# Patient Record
Sex: FEMALE | Race: WHITE | NOT HISPANIC OR LATINO | Employment: FULL TIME | URBAN - METROPOLITAN AREA
[De-identification: names, ages, dates, MRNs, and addresses within clinical notes are randomized per-mention and may not be internally consistent; named-entity substitution may affect disease eponyms.]

---

## 2017-01-31 NOTE — PROGRESS NOTES
"Rheumatology Visit     Veronica Sen MRN# [id number]   YOB: 1965 Age: 51 year old     Date of Visit: February 1, 2017  Primary care provider: Aide Mccord        Assessment and Plan:   1. Psoriatic arthritis/spondyloarthropathy: (oligoarthritis; strong family history of psoriasis; 9-15 hand xray erosion): Joint pain is less in the past month. Exam shows reduced tenderness at R 1rst CMC and anatomic snuffbox; unchanged flexion contracture R 5th DIP. In lab, 11-16 CBC, LFT are WNL.    I think that joint disease is improved. Whether or not improvement bears a causal relationship with starting apremilast cannot be determined for another 4-5 weeks. Arthropathy has featured steroid sensitivity, but she had no response to mtx or humira, and SSZ was not tolerated. Although she has seronegative SpA with minimal cutaneous burden, I believe that immunomodulatory Rx for a working dx of PsA could benefit her. We agreed upon the following plan:    a) Continue trial of Otezla 30 mg po bid. I think that maximum benefit should be noticeable within 60 days.  --CBC, Cr, CRP in 4-5 weeks.    2. R thumb pain, with De Quervain's tenosynovitis: she is more comfortable with exercises and splinting. I recommend naproxen 500 mg bid prn recurrent pain.    RTC 6 mos.    Joe Rodriguez MD  Staff Rheumatologist,  Health  Pager [phone]            Active Problem List:     Patient Active Problem List    Diagnosis Date Noted     Psoriatic arthritis (H) 04/11/2016     Priority: Medium            History of Present Illness:   Veronica Sen presents for follow-up psoriatic arthritis vs erosive OA. Last seen 7-16, when SSZ was discontinued, and trial of otezla was begun.    She has been seen in 2013 by Dr. Madrigal, Rheum, who diagnosed \"erosive osteoarthritis\". TSH, ESR, KRYSTA, Lyme were all negative in 2014. Started Mtx 15 mg weekly in 9-15. Discontinued mtx in 2-16 d/t lack of efficacy. SSZ d/c'd in 3-16 d/t GI " "intolerance. Humira trial 2016: no efficacy. Otezla 1-17 start.    Interval history:    She started otezla 1-17. Since then she notes reduction in joint symptoms, including less \"pressure\" sensation over both hands/fingers and feet, and less night pain. Ankles are less sore. Mornings are ok without stiffness. No low back pain. Tolerating daily walks.    The base of the R thumb is improved; she associates improvement in writing, sewing, handwork of all kinds, with OTx-prescribed exercises and occasional use of splint. She still notes that  opening jars is still adversely affected by the R thumb pain.    She has d/c'd regular use of OTC naproxen 440 bid due to reduced need for pain medication.  She started lipitor 12-16 for increased cholesterol.  No recurrence of hairline scalp psoriasis spot.       Review of Systems:   Review Of Systems  Constitutional: negative  Skin: rash on scalp improved  Eyes: continued dry eyes, managed with systane  Ears/Nose/Throat: HA :\"cluster\" around time of starting otezla  Respiratory: No shortness of breath, dyspnea on exertion, cough, or hemoptysis  Cardiovascular: negative  Gastrointestinal: adhesion related abd pain-tenderness.  Genitourinary: negative  Musculoskeletal: HPI  Neurologic: negative  Psychiatric: negative  Hematologic/Lymphatic/Immunologic: no LAP recently.  Endocrine: negative          Past Medical History:   No past medical history on file.  No past surgical history on file.  S/p bowel resection in 1-15 after ruptured diverticulum and abscess. She had colostomy, s/p takedown in 4-15. No evidence of colitis on Path. No colitis on colonoscopy    S/p childhood tonsilitis  S/p hysterectomy 2011  S/p C section X 3  Migraine HA since age 14  GERD, controlled with omeprazole  Has laryngospasm    Spondyloarthropathy: She has been seen in 2013 by Dr. Madrigal, Rheum, who diagnosed \"erosive osteoarthritis\". TSH, ESR, KRYSTA, Lyme were all negative in 2014. Started Mtx 15 mg weekly " in 9-15. Discontinued mtx in 2-16 d/t lack of efficacy. SSZ d/c'd in 3-16 d/t GI intolerance. Humira trial 2016: no efficacy. Otezla 7-16 start.       Social History:     Social History     Occupational History     Not on file.     Social History Main Topics     Smoking status: Never Smoker      Smokeless tobacco: Not on file     Alcohol Use: Not on file     Drug Use: Not on file     Sexual Activity: Not on file     3 children  Never smoker  Drinks beer 3 x per week  Enjoys walking 2.5 miles 3X per week--formerly ran [employer]  Works as a nurse part-time  ; spouse is a family physician       Family History:   No family history on file.  Mother and brother have psoriasis  Mother and father have arthritis in hands/ankles--onset in '70s.  2 children have central hypothyroidism  Nephew had growth hormone defcy  Youngest son has sz disorder.  Middle daughter has torn hip capsule.       Allergies:     Allergies   Allergen Reactions     Hydrocodone Nausea and Vomiting and Itching     Morphine Nausea and Vomiting and Itching   Sulfasalazine: bloating         Medications:     Current Outpatient Prescriptions   Medication Sig Dispense Refill     adalimumab (HUMIRA) 40 MG/0.8ML prefilled syringe kit Inject subcutaneously once every 2 weeks. 1 kit 5     apremilast (OTEZLA) 30 MG tablet Take 1 tablet (30 mg) by mouth 2 times daily. Hold for signs of infection, and then seek medical attention. 60 tablet 5     Apremilast (OTEZLA) 10 & 20 & 30 MG TBPK Take by mouth, according to the instructions on the packet.Hold for signs of infection,any GI upset, weight loss or depression concerns, and then seek medical attention. 1 each 0     OMEPRAZOLE PO Take 20 mg by mouth every morning       VITAMIN D, CHOLECALCIFEROL, PO Take 15,000 Units by mouth once a week       Multiple Vitamins-Minerals (MULTIVITAMIN PO) Take 1 tablet by mouth daily       SUMAtriptan Succinate (IMITREX PO) Take 50 mg by mouth as needed for migraine        "Cetirizine HCl (ZYRTEC ALLERGY PO) Take 10 mg by mouth as needed     Vit D 2000 meq daily  Uses imitrex 3X/month         Physical Exam:   Blood pressure 126/84, pulse 75, height 1.575 m (5' 2\"), weight 80.74 kg (178 lb), SpO2 99 %.  Wt Readings from Last 4 Encounters:   02/01/17 80.74 kg (178 lb)   07/13/16 77.747 kg (171 lb 6.4 oz)   04/06/16 77.792 kg (171 lb 8 oz)   12/02/15 78.472 kg (173 lb)     Constitutional: mild obesity; appearing younger than stated age; cooperative  Eyes: nl EOM, PERRLA, vision, conjunctiva, sclera  ENT: nl external ears, nose, hearing, lips, teeth, gums, throat  No mucous membrane lesions, normal saliva pool  Neck: no mass or thyroid enlargement  Resp: lungs clear to auscultation, nl to palpation  CV: RRR, no murmurs, rubs or gallops, no edema  GI: no ABD mass or tenderness, no HSM  : not tested  Lymph: no cervical, supraclavicular, inguinal or epitrochlear nodes  MS: All TMJ, neck, shoulder, elbow, wrist, MCP/PIP/DIP, spine, hip, knee, ankle, and foot MTP/IP joints were examined. No active synovitis or deformity. Full ROM.  Normal  strength. + flexion contracture, R5th DIP; subtle [place]-neck changes in multiple digits both hands; mildy tender at R 1rst CMC with circumduction.  Skin: Resolved: 0.5 cm patch of erythema and scale inside L auricle  Neuro: nl cranial nerves, strength, sensation, DTRs.   Psych: nl judgement, orientation, memory, affect.         Data:   Care everywhere results reviewed    Recent Labs   Lab Test  11/30/16   1335  07/13/16   1209  12/02/15   1149   WBC  5.5  5.8  8.2   HGB  14.0  13.6  13.3   HCT  40.5  40.4  39.7   MCV  92.7  93  94   PLT  258  211  283   AST  24  18  28   ALT  42  29  52*       Reviewed Rheumatology lab flowsheet  "

## 2017-02-01 ENCOUNTER — OFFICE VISIT (OUTPATIENT)
Dept: RHEUMATOLOGY | Facility: CLINIC | Age: 52
End: 2017-02-01
Attending: INTERNAL MEDICINE
Payer: COMMERCIAL

## 2017-02-01 VITALS
SYSTOLIC BLOOD PRESSURE: 126 MMHG | HEART RATE: 75 BPM | WEIGHT: 178 LBS | DIASTOLIC BLOOD PRESSURE: 84 MMHG | HEIGHT: 62 IN | BODY MASS INDEX: 32.76 KG/M2 | OXYGEN SATURATION: 99 %

## 2017-02-01 DIAGNOSIS — L40.50 PSORIATIC ARTHRITIS (H): Primary | ICD-10-CM

## 2017-02-01 PROCEDURE — 99212 OFFICE O/P EST SF 10 MIN: CPT | Mod: ZF

## 2017-02-01 ASSESSMENT — PAIN SCALES - GENERAL: PAINLEVEL: MILD PAIN (2)

## 2017-02-01 NOTE — NURSING NOTE
"Chief Complaint   Patient presents with     RECHECK     4 month follow up for Psoriatic arthritis (H)       Initial /84 mmHg  Pulse 75  Ht 1.575 m (5' 2\")  Wt 80.74 kg (178 lb)  BMI 32.55 kg/m2  SpO2 99% Estimated body mass index is 32.55 kg/(m^2) as calculated from the following:    Height as of this encounter: 1.575 m (5' 2\").    Weight as of this encounter: 80.74 kg (178 lb).  BP completed using cuff size: armin Mullins CMA    "

## 2017-02-01 NOTE — Clinical Note
"2/1/2017      RE: Veronica Sen  [address] [place] [address]       Rheumatology Visit     Veronica Sen MRN# [id number]   YOB: 1965 Age: 51 year old     Date of Visit: February 1, 2017  Primary care provider: Aide Mccord        Assessment and Plan:   1. Psoriatic arthritis/spondyloarthropathy: (oligoarthritis; strong family history of psoriasis; 9-15 hand xray erosion): Joint pain is less in the past month. Exam shows reduced tenderness at R 1rst CMC and anatomic snuffbox; unchanged flexion contracture R 5th DIP. In lab, 11-16 CBC, LFT are WNL.    I think that joint disease is improved. Whether or not improvement bears a causal relationship with starting apremilast cannot be determined for another 4-5 weeks. Arthropathy has featured steroid sensitivity, but she had no response to mtx or humira, and SSZ was not tolerated. Although she has seronegative SpA with minimal cutaneous burden, I believe that immunomodulatory Rx for a working dx of PsA could benefit her. We agreed upon the following plan:    a) Continue trial of Otezla 30 mg po bid. I think that maximum benefit should be noticeable within 60 days.  --CBC, Cr, CRP in 4-5 weeks.    2. R thumb pain, with De Quervain's tenosynovitis: she is more comfortable with exercises and splinting. I recommend naproxen 500 mg bid prn recurrent pain.    RTC 6 mos.    oJe Rodriguez MD  Staff Rheumatologist,  Health  Pager [phone]            Active Problem List:     Patient Active Problem List    Diagnosis Date Noted     Psoriatic arthritis (H) 04/11/2016     Priority: Medium            History of Present Illness:   Veronica Sen presents for follow-up psoriatic arthritis vs erosive OA. Last seen 7-16, when SSZ was discontinued, and trial of otezla was begun.    She has been seen in 2013 by Dr. Madrigal, Rheum, who diagnosed \"erosive osteoarthritis\". TSH, ESR, KRYSTA, Lyme were all negative in 2014. Started Mtx 15 mg weekly in 9-15. " "Discontinued mtx in 2-16 d/t lack of efficacy. SSZ d/c'd in 3-16 d/t GI intolerance. Humira trial 2016: no efficacy. Otezla 1-17 start.    Interval history:    She started otezla 1-17. Since then she notes reduction in joint symptoms, including less \"pressure\" sensation over both hands/fingers and feet, and less night pain. Ankles are less sore. Mornings are ok without stiffness. No low back pain. Tolerating daily walks.    The base of the R thumb is improved; she associates improvement in writing, sewing, handwork of all kinds, with OTx-prescribed exercises and occasional use of splint. She still notes that  opening jars is still adversely affected by the R thumb pain.    She has d/c'd regular use of OTC naproxen 440 bid due to reduced need for pain medication.  She started lipitor 12-16 for increased cholesterol.  No recurrence of hairline scalp psoriasis spot.       Review of Systems:   Review Of Systems  Constitutional: negative  Skin: rash on scalp improved  Eyes: continued dry eyes, managed with systane  Ears/Nose/Throat: HA :\"cluster\" around time of starting otezla  Respiratory: No shortness of breath, dyspnea on exertion, cough, or hemoptysis  Cardiovascular: negative  Gastrointestinal: adhesion related abd pain-tenderness.  Genitourinary: negative  Musculoskeletal: HPI  Neurologic: negative  Psychiatric: negative  Hematologic/Lymphatic/Immunologic: no LAP recently.  Endocrine: negative          Past Medical History:   No past medical history on file.  No past surgical history on file.  S/p bowel resection in 1-15 after ruptured diverticulum and abscess. She had colostomy, s/p takedown in 4-15. No evidence of colitis on Path. No colitis on colonoscopy    S/p childhood tonsilitis  S/p hysterectomy 2011  S/p C section X 3  Migraine HA since age 14  GERD, controlled with omeprazole  Has laryngospasm    Spondyloarthropathy: She has been seen in 2013 by Dr. Madrigal, Rheum, who diagnosed \"erosive osteoarthritis\". " TSH, ESR, KRYSTA, Lyme were all negative in 2014. Started Mtx 15 mg weekly in 9-15. Discontinued mtx in 2-16 d/t lack of efficacy. SSZ d/c'd in 3-16 d/t GI intolerance. Humira trial 2016: no efficacy. Otezla 7-16 start.       Social History:     Social History     Occupational History     Not on file.     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  Mother and brother have psoriasis  Mother and father have arthritis in hands/ankles--onset in '70s.  2 children have central hypothyroidism  Nephew had growth hormone defcy  Youngest son has sz disorder.  Middle daughter has torn hip capsule.       Allergies:     Allergies   Allergen Reactions     Hydrocodone Nausea and Vomiting and Itching     Morphine Nausea and Vomiting and Itching   Sulfasalazine: bloating         Medications:     Current Outpatient Prescriptions   Medication Sig Dispense Refill     adalimumab (HUMIRA) 40 MG/0.8ML prefilled syringe kit Inject subcutaneously once every 2 weeks. 1 kit 5     apremilast (OTEZLA) 30 MG tablet Take 1 tablet (30 mg) by mouth 2 times daily. Hold for signs of infection, and then seek medical attention. 60 tablet 5     Apremilast (OTEZLA) 10 & 20 & 30 MG TBPK Take by mouth, according to the instructions on the packet.Hold for signs of infection,any GI upset, weight loss or depression concerns, and then seek medical attention. 1 each 0     OMEPRAZOLE PO Take 20 mg by mouth every morning       VITAMIN D, CHOLECALCIFEROL, PO Take 15,000 Units by mouth once a week       Multiple Vitamins-Minerals (MULTIVITAMIN PO) Take 1 tablet by mouth daily       SUMAtriptan  "Succinate (IMITREX PO) Take 50 mg by mouth as needed for migraine       Cetirizine HCl (ZYRTEC ALLERGY PO) Take 10 mg by mouth as needed     Vit D 2000 meq daily  Uses imitrex 3X/month         Physical Exam:   Blood pressure 126/84, pulse 75, height 1.575 m (5' 2\"), weight 80.74 kg (178 lb), SpO2 99 %.  Wt Readings from Last 4 Encounters:   02/01/17 80.74 kg (178 lb)   07/13/16 77.747 kg (171 lb 6.4 oz)   04/06/16 77.792 kg (171 lb 8 oz)   12/02/15 78.472 kg (173 lb)     Constitutional: mild obesity; appearing younger than stated age; cooperative  Eyes: nl EOM, PERRLA, vision, conjunctiva, sclera  ENT: nl external ears, nose, hearing, lips, teeth, gums, throat  No mucous membrane lesions, normal saliva pool  Neck: no mass or thyroid enlargement  Resp: lungs clear to auscultation, nl to palpation  CV: RRR, no murmurs, rubs or gallops, no edema  GI: no ABD mass or tenderness, no HSM  : not tested  Lymph: no cervical, supraclavicular, inguinal or epitrochlear nodes  MS: All TMJ, neck, shoulder, elbow, wrist, MCP/PIP/DIP, spine, hip, knee, ankle, and foot MTP/IP joints were examined. No active synovitis or deformity. Full ROM.  Normal  strength. + flexion contracture, R5th DIP; subtle [place]-neck changes in multiple digits both hands; mildy tender at R 1rst CMC with circumduction.  Skin: Resolved: 0.5 cm patch of erythema and scale inside L auricle  Neuro: nl cranial nerves, strength, sensation, DTRs.   Psych: nl judgement, orientation, memory, affect.         Data:   Care everywhere results reviewed    Recent Labs   Lab Test  11/30/16   1335  07/13/16   1209  12/02/15   1149   WBC  5.5  5.8  8.2   HGB  14.0  13.6  13.3   HCT  40.5  40.4  39.7   MCV  92.7  93  94   PLT  258  211  283   AST  24  18  28   ALT  42  29  52*       Reviewed Rheumatology lab flowsheet    Joe Rodriguez MD      "

## 2017-02-01 NOTE — MR AVS SNAPSHOT
After Visit Summary   2/1/2017    Veronica Sen    MRN: [id number]           Patient Information     Date Of Birth          1965        Visit Information        Provider Department      2/1/2017 10:30 AM Joe Rodriguez MD [facility] Rheumatology        Today's Diagnoses     Psoriatic arthritis (H)    -  1        Follow-ups after your visit        Follow-up notes from your care team     Return in about 6 months (around 8/1/2017).      Your next 10 appointments already scheduled     Aug 02, 2017 10:00 AM   (Arrive by 9:45 AM)   Return Visit with Joe Rodriguez MD   [facility] Rheumatology ([facility])    [address] [place] [address]  3rd [facility] [address] [phone]              Future tests that were ordered for you today     Open Future Orders        Priority Expected Expires Ordered    ALT Routine  2/1/2018 2/1/2017    AST Routine  2/1/2018 2/1/2017    CRP inflammation Routine  2/1/2018 2/1/2017    Creatinine Routine  2/1/2018 2/1/2017    CBC with platelets Routine  2/1/2018 2/1/2017            Who to contact     If you have questions or need follow up information about today's clinic visit or your schedule please contact [facility] RHEUMATOLOGY directly at [phone].  Normal or non-critical lab and imaging results will be communicated to you by [employer], letter or phone within 4 business days after the clinic has received the results. If you do not hear from us within 7 days, please contact the clinic through [employer] or phone. If you have a critical or abnormal lab result, we will notify you by phone as soon as possible.  Submit refill requests through [facility] system or call your pharmacy and they will forward the refill request to us. Please allow 3 business days for your refill to be completed.          Additional Information About Your Visit        [employer] Information     Smart[facility] system gives you secure access to your electronic health record. If you see a  "primary care provider, you can also send messages to your care team and make appointments. If you have questions, please call your primary care clinic.  If you do not have a primary care provider, please call [phone] and they will assist you.        Care EveryWhere ID     This is your Care EveryWhere ID. This could be used by other organizations to access your [place] medical records  [id number]        Your Vitals Were     Pulse Height BMI (Body Mass Index) Pulse Oximetry          75 1.575 m (5' 2\") 32.55 kg/m2 99%         Blood Pressure from Last 3 Encounters:   02/01/17 126/84   07/13/16 120/78   04/06/16 112/76    Weight from Last 3 Encounters:   02/01/17 80.74 kg (178 lb)   07/13/16 77.747 kg (171 lb 6.4 oz)   04/06/16 77.792 kg (171 lb 8 oz)               Primary Care Provider    Aide Mccord       No address on file        Thank you!     Thank you for choosing [facility] RHEUMATOLOGY  for your care. Our goal is always to provide you with excellent care. Hearing back from our patients is one way we can continue to improve our services. Please take a few minutes to complete the written survey that you may receive in the mail after your visit with us. Thank you!             Your Updated Medication List - Protect others around you: Learn how to safely use, store and throw away your medicines at www.disposemymeds.org.          This list is accurate as of: 2/1/17 11:21 AM.  Always use your most recent med list.                   Brand Name Dispense Instructions for use    adalimumab 40 MG/0.8ML prefilled syringe kit    HUMIRA    1 kit    Inject subcutaneously once every 2 weeks.       * apremilast 30 MG tablet    OTEZLA    60 tablet    Take 1 tablet (30 mg) by mouth 2 times daily. Hold for signs of infection, and then seek medical attention.       * Apremilast 10 & 20 & 30 MG Tbpk    OTEZLA    1 each    Take by mouth, according to the instructions on the packet.Hold for signs of infection,any GI upset, " weight loss or depression concerns, and then seek medical attention.       IMITREX PO      Take 50 mg by mouth as needed for migraine       MULTIVITAMIN PO      Take 1 tablet by mouth daily       OMEPRAZOLE PO      Take 20 mg by mouth every morning       VITAMIN D (CHOLECALCIFEROL) PO      Take 15,000 Units by mouth once a week       ZYRTEC ALLERGY PO      Take 10 mg by mouth as needed       * Notice:  This list has 2 medication(s) that are the same as other medications prescribed for you. Read the directions carefully, and ask your doctor or other care provider to review them with you.

## 2017-02-13 ENCOUNTER — MYC MEDICAL ADVICE (OUTPATIENT)
Dept: RHEUMATOLOGY | Facility: CLINIC | Age: 52
End: 2017-02-13

## 2017-02-14 NOTE — TELEPHONE ENCOUNTER
Thank you for the followup.    The association between headaches and otezla seems strong, but it is not yet certain to be a causal relationship. I recommend that you finish the prescribed prednisone course, discontinue the otezla. If your headaches remain abolished once you are off prednisone and off otezla for 10 days, then I recommend a retrial of 30 mg otezla once daily for 10 days, followed by an increase to 30 twice daily if the headaches do not return.

## 2017-04-05 ENCOUNTER — MYC MEDICAL ADVICE (OUTPATIENT)
Dept: RHEUMATOLOGY | Facility: CLINIC | Age: 52
End: 2017-04-05

## 2017-05-11 ENCOUNTER — MYC MEDICAL ADVICE (OUTPATIENT)
Dept: RHEUMATOLOGY | Facility: CLINIC | Age: 52
End: 2017-05-11

## 2017-05-11 DIAGNOSIS — Z79.899 HIGH RISK MEDICATIONS (NOT ANTICOAGULANTS) LONG-TERM USE: ICD-10-CM

## 2017-05-11 DIAGNOSIS — L40.50 PSORIATIC ARTHRITIS (H): Primary | ICD-10-CM

## 2017-05-17 ENCOUNTER — MYC MEDICAL ADVICE (OUTPATIENT)
Dept: RHEUMATOLOGY | Facility: CLINIC | Age: 52
End: 2017-05-17

## 2017-05-19 NOTE — TELEPHONE ENCOUNTER
TB order has been faxed to the desired clinic. Patient notified via [employer].   Sushma Ford LPN

## 2017-05-22 ENCOUNTER — MYC MEDICAL ADVICE (OUTPATIENT)
Dept: RHEUMATOLOGY | Facility: CLINIC | Age: 52
End: 2017-05-22

## 2017-05-22 NOTE — TELEPHONE ENCOUNTER
Writer communicated to patient, via [employer],  that fax was sent to the fax number [phone] and was sent successfully.   Sushma Ford LPN

## 2017-05-26 ENCOUNTER — TELEPHONE (OUTPATIENT)
Dept: RHEUMATOLOGY | Facility: CLINIC | Age: 52
End: 2017-05-26

## 2017-05-26 NOTE — TELEPHONE ENCOUNTER
PA Initiation    Medication: COSENTYX  Insurance Company: [employer] - Phone [phone] Fax [phone]  Pharmacy Filling the Rx: [facility] - [place], [place] - [facility] [address]  Filling Pharmacy Phone: [phone]  Filling Pharmacy Fax: [phone]  Start Date: 5/26/2017        [facility] Authorization Team   Phone: [phone]  Fax: [phone]

## 2017-06-14 ENCOUNTER — MYC MEDICAL ADVICE (OUTPATIENT)
Dept: RHEUMATOLOGY | Facility: CLINIC | Age: 52
End: 2017-06-14

## 2017-06-16 ENCOUNTER — TELEPHONE (OUTPATIENT)
Dept: RHEUMATOLOGY | Facility: CLINIC | Age: 52
End: 2017-06-16

## 2017-06-16 LAB — QUANTIFERON TB GOLD: NORMAL TEXT

## 2017-06-16 NOTE — TELEPHONE ENCOUNTER
Medication Appeal Initiation    We have initiated an appeal for the requested medication:  Medication: Cosentyx  Appeal Start Date:  6/16/2017  Insurance Company:    Comments:       Faxed off appeal letter to [facility] **

## 2017-06-16 NOTE — TELEPHONE ENCOUNTER
Writer called Faustino HIM 6/15/17 and requested that patient's TB screen and Quantifieron Gold test results faxed to clinic. Writer was informed that records will be sent as soon as possible. Writer has not received fax,  called to follow up with HIM and was instructed to leave a message.  Sushma Ford LPN

## 2017-06-29 NOTE — TELEPHONE ENCOUNTER
Obtained the authorization to disclose information form (signed) and faxed off with appeal for the 2nd time, updated patient

## 2017-07-08 ENCOUNTER — HEALTH MAINTENANCE LETTER (OUTPATIENT)
Age: 52
End: 2017-07-08

## 2017-07-31 NOTE — TELEPHONE ENCOUNTER
Still in review for appeal per Jessy - insurance requested that [employer] (pharmacy benefits) fax denial packet to [employer] (they handle the appeals) on 7/14.

## 2017-08-01 NOTE — PROGRESS NOTES
"Rheumatology Visit     Veronica Sen MRN# [id number]   YOB: 1965 Age: 51 year old     Date of Visit: August 2, 2017  Primary care provider: Aide Mccord       Assessment and Plan:   1. Psoriatic arthritis/spondyloarthropathy: (oligoarthritis; strong family history of psoriasis; 9-15 hand xray erosion): Steroid sensitive joint pain is improved over the past 2 months; nevertheless, ADL disrupting arthralgia persists.  Exam reveals tenderness at right greater than left wrists, and left 2nd and 3rd MTP; unchanged flexion contracture R 5th DIP. In lab, 11-16 CBC, LFT were WNL.    I think that inflammatory joint disease persists, and novel disease modifying therapy is needed. Arthropathy has featured steroid sensitivity, but she had no response to mtx or humira, and neither apremilast nor SSZ were tolerated. Although she has seronegative SpA with minimal cutaneous burden, I believe that immunomodulatory Rx for a working dx of PsA could benefit her. We agreed upon the following plan:    a)  Start a trial of golimumab 50 mg once monthly subcutaneously. I think that maximum benefit should be noticeable within 3-4 weeks. If she has suboptimal response or intolerance to this medication, I will recommend substitution of etanercept 50 mg weekly for a brief trial. Plans to use anti-interleukin 17 therapy are on hold due to insurance requirements. I also discuss with the patient and her  today the possibility of using intermittent steroid \"burst and taper\" therapy for significant flares of inflammatory arthritis. I recommend using prednisone no more than once every 2 to 3 months in this fashion.  Given the patient's chronic use of naproxen, I recommend complete blood count, creatinine, and transaminases today.    2. R thumb pain, with De Quervain's tenosynovitis: she is more comfortable with exercises and splinting. I recommend continued naproxen 500 mg bid prn recurrent pain.    RTC 3-4 " "mos.    Joe Rodriguez MD  Staff Rheumatologist, [facility]  Pager [phone]            Active Problem List:     Patient Active Problem List    Diagnosis Date Noted     Psoriatic arthritis (H) 04/11/2016     Priority: Medium            History of Present Illness:   Veronica Sen presents for follow-up psoriatic arthritis vs erosive OA. Last seen 2-17, when trial of otezla was continued.    She has been seen in 2013 by Dr. Madrigal, Rheum, who diagnosed \"erosive osteoarthritis\". TSH, ESR, KRYSTA, Lyme were all negative in 2014. Started Mtx 15 mg weekly in 9-15. Discontinued mtx in 2-16 d/t lack of efficacy. SSZ d/c'd in 3-16 d/t GI intolerance. Humira trial 2016: no efficacy. Otezla 1-17 start.    Interval history 8-17:    She has been doing better for ~ 2 months. She notes decreased aching sleep disrupting pain late at night. She still has low grade pain in hands, and still has trouble opening jars, small motor tasks like grabbing a hairbrush. No visible swelling. There is some stiffness after immobility, but only 5 minutes of morning stiffness.    She had a burst of prednisone in late July for sore throat--she noted temporary improvement in joint pain. She takes naproxen 220-440 mg prn     She started otezla 1-17.  She stopped within a month due to severe headaches, and she could not restart the medication without resumption of headaches    The base of the R thumb is improved; she associates improvement in writing, sewing, handwork of all kinds, with OTx-prescribed exercises and occasional use of splint. She still notes that  opening jars is still adversely affected by the R thumb pain.    She started lipitor 12-16 for increased cholesterol.  No recurrence of hairline scalp psoriasis spot.       Review of Systems:   Review Of Systems  Constitutional: negative  Skin: rash on scalp improved  Eyes: continued dry eyes, managed with systane  Ears/Nose/Throat: HA :\"cluster\" around time of starting otezla  Respiratory: No " "shortness of breath, dyspnea on exertion, cough, or hemoptysis  Cardiovascular: negative  Gastrointestinal: adhesion related abd pain-tenderness.  Genitourinary: negative  Musculoskeletal: HPI  Neurologic: negative  Psychiatric: negative  Hematologic/Lymphatic/Immunologic: no LAP recently.  Endocrine: negative          Past Medical History:   No past medical history on file.  No past surgical history on file.  S/p bowel resection in 1-15 after ruptured diverticulum and abscess. She had colostomy, s/p takedown in 4-15. No evidence of colitis on Path. No colitis on colonoscopy    S/p childhood tonsilitis  S/p hysterectomy 2011  S/p C section X 3  Migraine HA since age 14  GERD, controlled with omeprazole  Has laryngospasm    Spondyloarthropathy: She had been seen in 2013 by Dr. Madrigal, Rheum, who diagnosed \"erosive osteoarthritis\". TSH, ESR, KRYSTA, Lyme were all negative in 2014. Started Mtx 15 mg weekly in 9-15. Discontinued mtx in 2-16 d/t lack of efficacy. SSZ d/c'd in 3-16 d/t GI intolerance. Humira trial 2016: no efficacy. Otezla 7-16 start. Unable to tolerated Otezla due to headaches. Started a trial of Simponi in July 2017.       Social History:     Social History     Occupational History     Not on file.     Social History Main Topics     Smoking status: Never Smoker     Smokeless tobacco: Not on file     Alcohol use Not on file     Drug use: Not on file     Sexual activity: Not on file     3 children  Never smoker  Drinks beer 3 x per week  Enjoys walking 2.5 miles 3X per week--formerly ran [employer]  Works as a nurse part-time  ; spouse is a family physician       Family History:   No family history on file.  Mother and brother have psoriasis  Mother and father have arthritis in hands/ankles--onset in '70s.  2 children have central hypothyroidism  Nephew had growth hormone defcy  Youngest son has sz disorder.  Middle daughter has torn hip capsule.       Allergies:     Allergies   Allergen " Reactions     Hydrocodone Nausea and Vomiting and Itching     Morphine Nausea and Vomiting and Itching   Sulfasalazine: bloating         Medications:     Current Outpatient Prescriptions   Medication Sig Dispense Refill     acetaminophen (TYLENOL) 325 MG tablet        atorvastatin (LIPITOR) 10 MG tablet        ibuprofen (ADVIL/MOTRIN) 200 MG tablet Take by mouth as needed       golimumab (SIMPONI) 50 MG/0.5ML auto-injector pen Inject 0.5 mLs (50 mg) Subcutaneous every 28 days Hold for signs of infection, and then seek medical attention. 3 each 1     OMEPRAZOLE PO Take 20 mg by mouth every morning       VITAMIN D, CHOLECALCIFEROL, PO Take 15,000 Units by mouth once a week       Multiple Vitamins-Minerals (MULTIVITAMIN PO) Take 1 tablet by mouth daily       SUMAtriptan Succinate (IMITREX PO) Take 50 mg by mouth as needed for migraine       Cetirizine HCl (ZYRTEC ALLERGY PO) Take 10 mg by mouth as needed       secukinumab (COSENTYX SENSOREADY PEN) 150 MG/ML Sensoready pen Inject 1 mL (150 mg) Subcutaneous once a week At weeks 0, 1, 2, 3, and 4 and every 4 weeks there after loading dose. Hold for signs of infection, and then seek medical attention. (Patient not taking: Reported on 8/2/2017) 5 mL 0     adalimumab (HUMIRA) 40 MG/0.8ML prefilled syringe kit Inject subcutaneously once every 2 weeks. (Patient not taking: Reported on 8/2/2017) 1 kit 5     apremilast (OTEZLA) 30 MG tablet Take 1 tablet (30 mg) by mouth 2 times daily. Hold for signs of infection, and then seek medical attention. (Patient not taking: Reported on 8/2/2017) 60 tablet 5     Apremilast (OTEZLA) 10 & 20 & 30 MG TBPK Take by mouth, according to the instructions on the packet.Hold for signs of infection,any GI upset, weight loss or depression concerns, and then seek medical attention. (Patient not taking: Reported on 8/2/2017) 1 each 0   Vit D 2000 meq daily  Uses imitrex 3X/month         Physical Exam:   Blood pressure 121/78, pulse 83, temperature  "98.2  F (36.8  C), temperature source Oral, height 1.575 m (5' 2\"), weight 78.9 kg (173 lb 14.4 oz), SpO2 97 %.  Wt Readings from Last 4 Encounters:   08/02/17 78.9 kg (173 lb 14.4 oz)   02/01/17 80.7 kg (178 lb)   07/13/16 77.7 kg (171 lb 6.4 oz)   04/06/16 77.8 kg (171 lb 8 oz)     Constitutional: mild obesity; appearing younger than stated age; cooperative  Eyes: nl EOM, PERRLA, vision, conjunctiva, sclera  ENT: nl external ears, nose, hearing, lips, teeth, gums, throat  No mucous membrane lesions, normal saliva pool  Neck: no mass or thyroid enlargement  Resp: lungs clear to auscultation, nl to palpation  CV: RRR, no murmurs, rubs or gallops, no edema  GI: no ABD mass or tenderness, no HSM  : not tested  Lymph: no cervical, supraclavicular, inguinal or epitrochlear nodes  MS: All TMJ, neck, shoulder, elbow, wrist, MCP/PIP/DIP, spine, hip, knee, ankle, and foot MTP/IP joints were examined. No active synovitis or deformity. Full ROM.  Normal  strength.  Painful flexion and extension at both wrists; tenderness at the volar surface of right greater than left wrists without effusion; mildy tender at R 1rst CMC with circumduction; tenderness at right 2nd and 3rd MTPs.  Skin: Resolved: 0.5 cm patch of erythema and scale inside L auricle  Neuro: nl cranial nerves, strength, sensation, DTRs.   Psych: nl judgement, orientation, memory, affect.         Data:   Care everywhere results reviewed    Recent Labs   Lab Test  11/30/16   1335  07/13/16   1209  12/02/15   1149   WBC  5.5  5.8  8.2   HGB  14.0  13.6  13.3   HCT  40.5  40.4  39.7   MCV  92.7  93  94   PLT  258  211  283   AST  24  18  28   ALT  42  29  52*       Reviewed Rheumatology lab flowsheet  "

## 2017-08-02 ENCOUNTER — OFFICE VISIT (OUTPATIENT)
Dept: RHEUMATOLOGY | Facility: CLINIC | Age: 52
End: 2017-08-02
Attending: INTERNAL MEDICINE
Payer: COMMERCIAL

## 2017-08-02 VITALS
DIASTOLIC BLOOD PRESSURE: 78 MMHG | WEIGHT: 173.9 LBS | TEMPERATURE: 98.2 F | HEART RATE: 83 BPM | OXYGEN SATURATION: 97 % | HEIGHT: 62 IN | BODY MASS INDEX: 32 KG/M2 | SYSTOLIC BLOOD PRESSURE: 121 MMHG

## 2017-08-02 DIAGNOSIS — Z79.899 ENCOUNTER FOR LONG-TERM (CURRENT) USE OF MEDICATIONS: Primary | ICD-10-CM

## 2017-08-02 DIAGNOSIS — L40.50 PSORIATIC ARTHRITIS (H): ICD-10-CM

## 2017-08-02 PROCEDURE — 99212 OFFICE O/P EST SF 10 MIN: CPT | Mod: ZF

## 2017-08-02 RX ORDER — ACETAMINOPHEN 325 MG/1
TABLET ORAL
COMMUNITY

## 2017-08-02 RX ORDER — ATORVASTATIN CALCIUM 10 MG/1
TABLET, FILM COATED ORAL
COMMUNITY
Start: 2017-06-13 | End: 2020-10-06

## 2017-08-02 RX ORDER — IBUPROFEN 200 MG
TABLET ORAL PRN
COMMUNITY

## 2017-08-02 ASSESSMENT — PAIN SCALES - GENERAL: PAINLEVEL: MILD PAIN (3)

## 2017-08-02 NOTE — TELEPHONE ENCOUNTER
MEDICATION APPEAL DENIED    Medication: Cosentyx - Appeal denied     Denial Date: 8/2/2017    Denial Rational: Patient must try and fail Enbrel, Simponi and Stelara     Second Level Appeal Information:     Second level appeals will be managed by the clinic staff and provider. Please contact the [employer] Prior Authorization Team if additional information about the denial is needed.      ** Please see Epic media tab for full denial letter **

## 2017-08-02 NOTE — NURSING NOTE
"Chief Complaint   Patient presents with     RECHECK     Follow up Psoriatic arthritis.       Initial /78  Pulse 83  Temp 98.2  F (36.8  C) (Oral)  Ht 1.575 m (5' 2\")  Wt 78.9 kg (173 lb 14.4 oz)  SpO2 97%  BMI 31.81 kg/m2 Estimated body mass index is 31.81 kg/(m^2) as calculated from the following:    Height as of this encounter: 1.575 m (5' 2\").    Weight as of this encounter: 78.9 kg (173 lb 14.4 oz).  Medication Reconciliation: complete   Jesys Reeder., CMA    "

## 2017-08-02 NOTE — LETTER
Date:August 3, 2017      Patient was self referred, no letter generated. Do not send.        [facility] Health Information

## 2017-08-02 NOTE — LETTER
"8/2/2017      RE: Veronica Sen  [address] [place] [address]       Rheumatology Visit     Veronica Sen MRN# [id number]   YOB: 1965 Age: 51 year old     Date of Visit: August 2, 2017  Primary care provider: Aide Mccord       Assessment and Plan:   1. Psoriatic arthritis/spondyloarthropathy: (oligoarthritis; strong family history of psoriasis; 9-15 hand xray erosion): Steroid sensitive joint pain is improved over the past 2 months; nevertheless, ADL disrupting arthralgia persists.  Exam reveals tenderness at right greater than left wrists, and left 2nd and 3rd MTP; unchanged flexion contracture R 5th DIP. In lab, 11-16 CBC, LFT were WNL.    I think that inflammatory joint disease persists, and novel disease modifying therapy is needed. Arthropathy has featured steroid sensitivity, but she had no response to mtx or humira, and neither apremilast nor SSZ were tolerated. Although she has seronegative SpA with minimal cutaneous burden, I believe that immunomodulatory Rx for a working dx of PsA could benefit her. We agreed upon the following plan:    a)  Start a trial of golimumab 50 mg once monthly subcutaneously. I think that maximum benefit should be noticeable within 3-4 weeks. If she has suboptimal response or intolerance to this medication, I will recommend substitution of etanercept 50 mg weekly for a brief trial. Plans to use anti-interleukin 17 therapy are on hold due to insurance requirements. I also discuss with the patient and her  today the possibility of using intermittent steroid \"burst and taper\" therapy for significant flares of inflammatory arthritis. I recommend using prednisone no more than once every 2 to 3 months in this fashion.  Given the patient's chronic use of naproxen, I recommend complete blood count, creatinine, and transaminases today.    2. R thumb pain, with De Quervain's tenosynovitis: she is more comfortable with exercises and splinting. I " "recommend continued naproxen 500 mg bid prn recurrent pain.    RTC 3-4 mos.    Joe Rodriguez MD  Staff Rheumatologist,  Health  Pager [phone]            Active Problem List:     Patient Active Problem List    Diagnosis Date Noted     Psoriatic arthritis (H) 04/11/2016     Priority: Medium            History of Present Illness:   Veronica Sen presents for follow-up psoriatic arthritis vs erosive OA. Last seen 2-17, when trial of otezla was continued.    She has been seen in 2013 by Dr. Madrigal, Rheum, who diagnosed \"erosive osteoarthritis\". TSH, ESR, KRYSTA, Lyme were all negative in 2014. Started Mtx 15 mg weekly in 9-15. Discontinued mtx in 2-16 d/t lack of efficacy. SSZ d/c'd in 3-16 d/t GI intolerance. Humira trial 2016: no efficacy. Otezla 1-17 start.    Interval history 8-17:    She has been doing better for ~ 2 months. She notes decreased aching sleep disrupting pain late at night. She still has low grade pain in hands, and still has trouble opening jars, small motor tasks like grabbing a hairbrush. No visible swelling. There is some stiffness after immobility, but only 5 minutes of morning stiffness.    She had a burst of prednisone in late July for sore throat--she noted temporary improvement in joint pain. She takes naproxen 220-440 mg prn     She started otezla 1-17.  She stopped within a month due to severe headaches, and she could not restart the medication without resumption of headaches    The base of the R thumb is improved; she associates improvement in writing, sewing, handwork of all kinds, with OTx-prescribed exercises and occasional use of splint. She still notes that  opening jars is still adversely affected by the R thumb pain.    She started lipitor 12-16 for increased cholesterol.  No recurrence of hairline scalp psoriasis spot.       Review of Systems:   Review Of Systems  Constitutional: negative  Skin: rash on scalp improved  Eyes: continued dry eyes, managed with " "systane  Ears/Nose/Throat: HA :\"cluster\" around time of starting otezla  Respiratory: No shortness of breath, dyspnea on exertion, cough, or hemoptysis  Cardiovascular: negative  Gastrointestinal: adhesion related abd pain-tenderness.  Genitourinary: negative  Musculoskeletal: HPI  Neurologic: negative  Psychiatric: negative  Hematologic/Lymphatic/Immunologic: no LAP recently.  Endocrine: negative          Past Medical History:   No past medical history on file.  No past surgical history on file.  S/p bowel resection in 1-15 after ruptured diverticulum and abscess. She had colostomy, s/p takedown in 4-15. No evidence of colitis on Path. No colitis on colonoscopy    S/p childhood tonsilitis  S/p hysterectomy 2011  S/p C section X 3  Migraine HA since age 14  GERD, controlled with omeprazole  Has laryngospasm    Spondyloarthropathy: She had been seen in 2013 by Dr. Madrigal, Rheum, who diagnosed \"erosive osteoarthritis\". TSH, ESR, KRYSTA, Lyme were all negative in 2014. Started Mtx 15 mg weekly in 9-15. Discontinued mtx in 2-16 d/t lack of efficacy. SSZ d/c'd in 3-16 d/t GI intolerance. Humira trial 2016: no efficacy. Otezla 7-16 start. Unable to tolerated Otezla due to headaches. Started a trial of Simponi in July 2017.       Social History:     Social History     Occupational History     Not on file.     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  Mother and brother have psoriasis  Mother and father have arthritis in hands/ankles--onset in '70s.  2 children have central hypothyroidism  Nephew had growth hormone defcy  Youngest son has sz " disorder.  Middle daughter has torn hip capsule.       Allergies:     Allergies   Allergen Reactions     Hydrocodone Nausea and Vomiting and Itching     Morphine Nausea and Vomiting and Itching   Sulfasalazine: bloating         Medications:     Current Outpatient Prescriptions   Medication Sig Dispense Refill     acetaminophen (TYLENOL) 325 MG tablet        atorvastatin (LIPITOR) 10 MG tablet        ibuprofen (ADVIL/MOTRIN) 200 MG tablet Take by mouth as needed       golimumab (SIMPONI) 50 MG/0.5ML auto-injector pen Inject 0.5 mLs (50 mg) Subcutaneous every 28 days Hold for signs of infection, and then seek medical attention. 3 each 1     OMEPRAZOLE PO Take 20 mg by mouth every morning       VITAMIN D, CHOLECALCIFEROL, PO Take 15,000 Units by mouth once a week       Multiple Vitamins-Minerals (MULTIVITAMIN PO) Take 1 tablet by mouth daily       SUMAtriptan Succinate (IMITREX PO) Take 50 mg by mouth as needed for migraine       Cetirizine HCl (ZYRTEC ALLERGY PO) Take 10 mg by mouth as needed       secukinumab (COSENTYX SENSOREADY PEN) 150 MG/ML Sensoready pen Inject 1 mL (150 mg) Subcutaneous once a week At weeks 0, 1, 2, 3, and 4 and every 4 weeks there after loading dose. Hold for signs of infection, and then seek medical attention. (Patient not taking: Reported on 8/2/2017) 5 mL 0     adalimumab (HUMIRA) 40 MG/0.8ML prefilled syringe kit Inject subcutaneously once every 2 weeks. (Patient not taking: Reported on 8/2/2017) 1 kit 5     apremilast (OTEZLA) 30 MG tablet Take 1 tablet (30 mg) by mouth 2 times daily. Hold for signs of infection, and then seek medical attention. (Patient not taking: Reported on 8/2/2017) 60 tablet 5     Apremilast (OTEZLA) 10 & 20 & 30 MG TBPK Take by mouth, according to the instructions on the packet.Hold for signs of infection,any GI upset, weight loss or depression concerns, and then seek medical attention. (Patient not taking: Reported on 8/2/2017) 1 each 0   Vit D 2000 meq  "daily  Uses imitrex 3X/month         Physical Exam:   Blood pressure 121/78, pulse 83, temperature 98.2  F (36.8  C), temperature source Oral, height 1.575 m (5' 2\"), weight 78.9 kg (173 lb 14.4 oz), SpO2 97 %.  Wt Readings from Last 4 Encounters:   08/02/17 78.9 kg (173 lb 14.4 oz)   02/01/17 80.7 kg (178 lb)   07/13/16 77.7 kg (171 lb 6.4 oz)   04/06/16 77.8 kg (171 lb 8 oz)     Constitutional: mild obesity; appearing younger than stated age; cooperative  Eyes: nl EOM, PERRLA, vision, conjunctiva, sclera  ENT: nl external ears, nose, hearing, lips, teeth, gums, throat  No mucous membrane lesions, normal saliva pool  Neck: no mass or thyroid enlargement  Resp: lungs clear to auscultation, nl to palpation  CV: RRR, no murmurs, rubs or gallops, no edema  GI: no ABD mass or tenderness, no HSM  : not tested  Lymph: no cervical, supraclavicular, inguinal or epitrochlear nodes  MS: All TMJ, neck, shoulder, elbow, wrist, MCP/PIP/DIP, spine, hip, knee, ankle, and foot MTP/IP joints were examined. No active synovitis or deformity. Full ROM.  Normal  strength.  Painful flexion and extension at both wrists; tenderness at the volar surface of right greater than left wrists without effusion; mildy tender at R 1rst CMC with circumduction; tenderness at right 2nd and 3rd MTPs.  Skin: Resolved: 0.5 cm patch of erythema and scale inside L auricle  Neuro: nl cranial nerves, strength, sensation, DTRs.   Psych: nl judgement, orientation, memory, affect.         Data:   Care everywhere results reviewed    Recent Labs   Lab Test  11/30/16   1335  07/13/16   1209  12/02/15   1149   WBC  5.5  5.8  8.2   HGB  14.0  13.6  13.3   HCT  40.5  40.4  39.7   MCV  92.7  93  94   PLT  258  211  283   AST  24  18  28   ALT  42  29  52*       Reviewed Rheumatology lab flowsheet    Joe Rodriguez MD      "

## 2017-08-02 NOTE — MR AVS SNAPSHOT
After Visit Summary   8/2/2017    Veronica Sen    MRN: [id number]           Patient Information     Date Of Birth          1965        Visit Information        Provider Department      8/2/2017 10:00 AM Joe Rodriguez MD [facility] Rheumatology        Today's Diagnoses     Encounter for long-term (current) use of medications    -  1    Psoriatic arthritis (H)           Follow-ups after your visit        Follow-up notes from your care team     Return in about 3 months (around 11/2/2017).      Your next 10 appointments already scheduled     Nov 10, 2017  9:00 AM CST   (Arrive by 8:45 AM)   Return Visit with Joe Rodriguez MD   [facility] Rheumatology ([facility] [place])    [address] [place] [id number] [phone]              Future tests that were ordered for you today     Open Future Orders        Priority Expected Expires Ordered    Creatinine Routine  8/2/2018 8/2/2017    CBC with platelets Routine  8/2/2018 8/2/2017    ALT Routine  8/2/2018 8/2/2017    AST Routine  8/2/2018 8/2/2017            Who to contact     If you have questions or need follow up information about today's clinic visit or your schedule please contact [facility] RHEUMATOLOGY directly at [phone].  Normal or non-critical lab and imaging results will be communicated to you by [employer], letter or phone within 4 business days after the clinic has received the results. If you do not hear from us within 7 days, please contact the clinic through [employer] or phone. If you have a critical or abnormal lab result, we will notify you by phone as soon as possible.  Submit refill requests through [employer] or call your pharmacy and they will forward the refill request to us. Please allow 3 business days for your refill to be completed.          Additional Information About Your Visit        [employer] Information     [employer] gives you secure access to your electronic health  "record. If you see a primary care provider, you can also send messages to your care team and make appointments. If you have questions, please call your primary care clinic.  If you do not have a primary care provider, please call [phone] and they will assist you.        Care EveryWhere ID     This is your Care EveryWhere ID. This could be used by other organizations to access your [facility] medical records  [id number]        Your Vitals Were     Pulse Temperature Height Pulse Oximetry BMI (Body Mass Index)       83 98.2  F (36.8  C) (Oral) 1.575 m (5' 2\") 97% 31.81 kg/m2        Blood Pressure from Last 3 Encounters:   08/02/17 121/78   02/01/17 126/84   07/13/16 120/78    Weight from Last 3 Encounters:   08/02/17 78.9 kg (173 lb 14.4 oz)   02/01/17 80.7 kg (178 lb)   07/13/16 77.7 kg (171 lb 6.4 oz)                 Today's Medication Changes          These changes are accurate as of: 8/2/17 11:22 AM.  If you have any questions, ask your nurse or doctor.               Start taking these medicines.        Dose/Directions    golimumab 50 MG/0.5ML auto-injector pen   Commonly known as:  SIMPONI   Used for:  Psoriatic arthritis (H)   Started by:  Joe Rodriguez MD        Dose:  50 mg   Inject 0.5 mLs (50 mg) Subcutaneous every 28 days Hold for signs of infection, and then seek medical attention.   Quantity:  3 each   Refills:  1            Where to get your medicines      These medications were sent to [facility] - [place], [place] - [address] [place] [address], [facility] [id number]    Hours:  Mon-Fri 8:30am-5:00pm Toll Free [phone] Phone:  [phone]     golimumab 50 MG/0.5ML auto-injector pen                Primary Care Provider    Aide Mccord       No address on file        Equal Access to Services     MARIE ADKINS AH: Ksenia Crockett, wajanaeda anna, qagallo lindseyn ah. So [facility] " [phone].    ATENCIÓN: Si anne kennedy, tiene a duffy disposición servicios gratuitos de asistencia lingüística. Shaniqua cordova [phone].    We comply with applicable federal civil rights laws and [place] laws. We do not discriminate on the basis of race, color, national origin, age, disability sex, sexual orientation or gender identity.            Thank you!     Thank you for choosing [facility] RHEUMATOLOGY  for your care. Our goal is always to provide you with excellent care. Hearing back from our patients is one way we can continue to improve our services. Please take a few minutes to complete the written survey that you may receive in the mail after your visit with us. Thank you!             Your Updated Medication List - Protect others around you: Learn how to safely use, store and throw away your medicines at www.disposemymeds.org.          This list is accurate as of: 8/2/17 11:22 AM.  Always use your most recent med list.                   Brand Name Dispense Instructions for use Diagnosis    acetaminophen 325 MG tablet    TYLENOL          adalimumab 40 MG/0.8ML prefilled syringe kit    HUMIRA    1 kit    Inject subcutaneously once every 2 weeks.    Psoriatic arthritis (H)       * apremilast 30 MG tablet    OTEZLA    60 tablet    Take 1 tablet (30 mg) by mouth 2 times daily. Hold for signs of infection, and then seek medical attention.    Psoriatic arthritis (H)       * Apremilast 10 & 20 & 30 MG Tbpk    OTEZLA    1 each    Take by mouth, according to the instructions on the packet.Hold for signs of infection,any GI upset, weight loss or depression concerns, and then seek medical attention.    Psoriatic arthritis (H)       atorvastatin 10 MG tablet    LIPITOR          golimumab 50 MG/0.5ML auto-injector pen    SIMPONI    3 each    Inject 0.5 mLs (50 mg) Subcutaneous every 28 days Hold for signs of infection, and then seek medical attention.    Psoriatic arthritis (H)       ibuprofen 200 MG tablet     ADVIL/MOTRIN     Take by mouth as needed        IMITREX PO      Take 50 mg by mouth as needed for migraine        MULTIVITAMIN PO      Take 1 tablet by mouth daily        OMEPRAZOLE PO      Take 20 mg by mouth every morning        secukinumab 150 MG/ML Sensoready pen    COSENTYX SENSOREADY PEN    5 mL    Inject 1 mL (150 mg) Subcutaneous once a week At weeks 0, 1, 2, 3, and 4 and every 4 weeks there after loading dose. Hold for signs of infection, and then seek medical attention.    Psoriatic arthritis (H)       VITAMIN D (CHOLECALCIFEROL) PO      Take 15,000 Units by mouth once a week        ZYRTEC ALLERGY PO      Take 10 mg by mouth as needed        * Notice:  This list has 2 medication(s) that are the same as other medications prescribed for you. Read the directions carefully, and ask your doctor or other care provider to review them with you.

## 2017-10-16 ENCOUNTER — MYC MEDICAL ADVICE (OUTPATIENT)
Dept: RHEUMATOLOGY | Facility: CLINIC | Age: 52
End: 2017-10-16

## 2017-10-16 DIAGNOSIS — L40.50 PSORIATIC ARTHRITIS (H): Primary | ICD-10-CM

## 2017-10-17 ENCOUNTER — TELEPHONE (OUTPATIENT)
Dept: RHEUMATOLOGY | Facility: CLINIC | Age: 52
End: 2017-10-17

## 2017-10-17 ENCOUNTER — MYC MEDICAL ADVICE (OUTPATIENT)
Dept: RHEUMATOLOGY | Facility: CLINIC | Age: 52
End: 2017-10-17

## 2017-10-17 NOTE — TELEPHONE ENCOUNTER
Pt has psoriatic arthritis. She has trailed and failed MTx, SSZ, humira, otezla, and now Simponi. I recommend a trial of anti-IL-17 (co-sentyx).  Please assist me with orders, and with prior authorization work-up.

## 2017-10-17 NOTE — TELEPHONE ENCOUNTER
PA Initiation    Medication: COSENTYX   Insurance Company: [facility] - Phone [phone] Fax [phone]  Pharmacy Filling the Rx: [facility] - [place], [place] - [facility] [address]  Filling Pharmacy Phone:    Filling Pharmacy Fax:    Start Date: 10/17/2017

## 2017-10-18 NOTE — TELEPHONE ENCOUNTER
Appointment scheduled 11/10/17 has been rescheduled to 11/17/17 at 8 am.    TRENT EspinozaN RN  Rheumatology RN Coordinator  [facility]

## 2017-10-20 NOTE — TELEPHONE ENCOUNTER
Prior Authorization Approval    Authorization Effective Date:    Authorization Expiration Date:    Medication: secukinumab (Cosentyx)  Approved Dose/Quantity:   Reference #: [id number]   Insurance Company: [employer] [place] - Phone [phone] Fax [phone]  Expected CoPay:       CoPay Card Available:      Foundation Assistance Needed:    Which Pharmacy is filling the prescription (Not needed for infusion/clinic administered): [facility] - [place] [address] [address]  Pharmacy Notified: Yes  Patient Notified:      ** Awaiting actual letter with dates

## 2017-10-30 NOTE — TELEPHONE ENCOUNTER
Cosentyx  PA approved. Refill of the Cosentyx has been set up and routed to Dr Rodriguez to approve.    TRENT EspinozaN RN  Rheumatology RN Coordinator   [place]

## 2017-11-16 NOTE — PROGRESS NOTES
"Rheumatology Visit     Veronica Sen MRN# [id number]   YOB: 1965 Age: 51 year old     Date of Visit: November 17, 2017  Primary care provider: Aide Mccord       Assessment and Plan:   # Psoriatic arthritis/spondyloarthropathy (oligoarthritis; strong family history of psoriasis; 9-15 hand xray erosion): Steroid sensitive joint pain is improved over the past 5 weeks. With marked diminution in ADL-disrupting arthralgia. Exam reveals minimal joint tenderness. In lab, 11-17 CBC, LFT, CRP, Cr were WNL.    I think that inflammatory arthropathy has improved in association with anti-IL-17 mAb. Although she has seronegative SpA with minimal cutaneous burden, I think that continued immunomodulatory Rx for a working dx of PsA will benefit her. We agreed upon the following plan:    -- D/c golimumab  -- Continue cosentyx 150 mg sq weekly.  I also discussed with the patient and her  potential of using intermittent steroid \"burst and taper\" therapy for significant flares of inflammatory arthritis. I recommend using prednisone no more than once every 2 to 3 months in this fashion.  -- naproxen 440 mg daily prn    Given the patient's chronic use of naproxen, I recommended complete blood count, creatinine, and transaminases today.    2. R thumb pain, with De Quervain's tenosynovitis: she is more comfortable with exercises and splinting. I recommend continued naproxen 500 mg bid prn recurrent pain.    RTC 4 mos.    Joe Rodriguez MD  Staff Rheumatologist,  [facility]  Pager [phone]            Active Problem List:     Patient Active Problem List    Diagnosis Date Noted     Psoriatic arthritis (H) 04/11/2016     Priority: Medium            History of Present Illness:   Veronica Sen presents for follow-up psoriatic arthritis vs erosive OA. Last seen 8-17, when trial of golimumab was started    She has been seen in 2013 by Dr. Madrigal, Rheum, who diagnosed \"erosive osteoarthritis\". TSH, ESR, KRYSTA, Lyme " "were all negative in 2014. Started Mtx 15 mg weekly in 9-15. Discontinued mtx in 2-16 d/t lack of efficacy. SSZ d/c'd in 3-16 d/t GI intolerance. Humira trial 2016: no efficacy. Otezla 1-17 start--d/c due to headaches. Started goliumab in 8-17, stopped due to no efficacy. Cosentyx started 10-17.    Interval history 8-17:    She has been doing better for ~ 2 months. She notes decreased aching sleep disrupting pain late at night. She still has low grade pain in hands, and still has trouble opening jars, small motor tasks like grabbing a hairbrush. No visible swelling. There is some stiffness after immobility, but only 5 minutes of morning stiffness.    She had a burst of prednisone in late July for sore throat--she noted temporary improvement in joint pain. She takes naproxen 220-440 mg prn     She started otezla 1-17.  She stopped within a month due to severe headaches, and she could not restart the medication without resumption of headaches    The base of the R thumb is improved; she associates improvement in writing, sewing, handwork of all kinds, with OTx-prescribed exercises and occasional use of splint. She still notes that  opening jars is still adversely affected by the R thumb pain.    She started lipitor 12-16 for increased cholesterol.  No recurrence of hairline scalp psoriasis spot.    Interval history 11-17:    She started cosentyx in 10-17. Within 2 weeks, she noted significant improvement in lower extremity joint \"guarding\" and finger swelling--here rings go off much easier. Her  notes easier movement and walking in the mornings.  She notes reduced nightime discomfort and less hand pain with activity.  The former scaly patch on her left scalp has disappeared.  cosentyx injections sting, but she has completed he loading period without difficulty.  She uses imitrex 3-4 times per week, a modest increase from before starting cosentyx.  L knee meniscus torn several months ago. Scope is planned in " "12-17.         Review of Systems:   Review Of Systems  Constitutional: mild cough, nasal congestion  Skin: rash on scalp improved  Eyes: continued dry eyes, managed with systane  Ears/Nose/Throat: HA : migraine controlled with imitrex  Respiratory: No shortness of breath, dyspnea on exertion, cough, or hemoptysis  Cardiovascular: negative  Gastrointestinal: adhesion related abd pain-tenderness.  Genitourinary: negative  Musculoskeletal: HPI  Neurologic: negative  Psychiatric: negative  Hematologic/Lymphatic/Immunologic: no LAP recently.  Endocrine: negative          Past Medical History:   No past medical history on file.  No past surgical history on file.  S/p bowel resection in 1-15 after ruptured diverticulum and abscess. She had colostomy, s/p takedown in 4-15. No evidence of colitis on Path. No colitis on colonoscopy    S/p childhood tonsilitis  S/p hysterectomy 2011  S/p C section X 3  Migraine HA since age 14  GERD, controlled with omeprazole  Has laryngospasm    Spondyloarthropathy: She had been seen in 2013 by Dr. Madrigal, Rheum, who diagnosed \"erosive osteoarthritis\". TSH, ESR, KRYSTA, Lyme were all negative in 2014. Started Mtx 15 mg weekly in 9-15. Discontinued mtx in 2-16 d/t lack of efficacy. SSZ d/c'd in 3-16 d/t GI intolerance. Humira trial 2016: no efficacy. Otezla 7-16 start. Unable to tolerated Otezla due to headaches. Started a trial of Simponi in July 2017.       Social History:     Social History     Occupational History     Not on file.     Social History Main Topics     Smoking status: Never Smoker     Smokeless tobacco: Not on file     Alcohol use Not on file     Drug use: Not on file     Sexual activity: Not on file     3 children  Never smoker  Drinks beer 3 x per week  Enjoys walking 2.5 miles 3X per week--formerly ran half-marathons  Works as a nurse part-time  ; spouse is a family physician       Family History:   No family history on file.  Mother and brother have " "psoriasis  Mother and father have arthritis in hands/ankles--onset in '70s.  2 children have central hypothyroidism  Nephew had growth hormone defcy  Youngest son has sz disorder.  Middle daughter has torn hip capsule.       Allergies:     Allergies   Allergen Reactions     Hydrocodone Nausea and Vomiting and Itching     Morphine Nausea and Vomiting and Itching   Sulfasalazine: bloating         Medications:     Current Outpatient Prescriptions   Medication Sig Dispense Refill     secukinumab (COSENTYX SENSOREADY PEN) 150 MG/ML Sensoready pen Inject 1 mL (150 mg) Subcutaneous every 28 days Hold for signs of infection, and then seek medical attention. 3 mL 1     acetaminophen (TYLENOL) 325 MG tablet        atorvastatin (LIPITOR) 10 MG tablet        ibuprofen (ADVIL/MOTRIN) 200 MG tablet Take by mouth as needed       secukinumab (COSENTYX SENSOREADY PEN) 150 MG/ML Sensoready pen Inject 1 mL (150 mg) Subcutaneous once a week At weeks 0, 1, 2, 3, and 4 and every 4 weeks there after loading dose. Hold for signs of infection, and then seek medical attention. 5 mL 0     OMEPRAZOLE PO Take 20 mg by mouth every morning       VITAMIN D, CHOLECALCIFEROL, PO Take 15,000 Units by mouth once a week       Multiple Vitamins-Minerals (MULTIVITAMIN PO) Take 1 tablet by mouth daily       SUMAtriptan Succinate (IMITREX PO) Take 50 mg by mouth as needed for migraine       Cetirizine HCl (ZYRTEC ALLERGY PO) Take 10 mg by mouth as needed     Vit D 2000 meq daily  Uses imitrex 3X/month  Uses naproxen 440 mg daily.         Physical Exam:   Blood pressure 124/84, pulse 92, height 1.575 m (5' 2\"), weight 78.6 kg (173 lb 3.2 oz), SpO2 97 %.  Wt Readings from Last 4 Encounters:   11/17/17 78.6 kg (173 lb 3.2 oz)   08/02/17 78.9 kg (173 lb 14.4 oz)   02/01/17 80.7 kg (178 lb)   07/13/16 77.7 kg (171 lb 6.4 oz)     Constitutional: mild obesity; appearing younger than stated age; cooperative  Eyes: nl EOM, PERRLA, vision, conjunctiva, sclera  ENT: " nl external ears, nose, hearing, lips, teeth, gums, throat  No mucous membrane lesions, normal saliva pool  Neck: no mass or thyroid enlargement  Resp: lungs clear to auscultation, nl to palpation  CV: RRR, no murmurs, rubs or gallops, no edema  GI: no ABD mass or tenderness, no HSM  : not tested  Lymph: no cervical, supraclavicular, inguinal or epitrochlear nodes  MS: All TMJ, neck, shoulder, elbow, wrist, MCP/PIP/DIP, spine, hip, knee, ankle, and foot MTP/IP joints were examined. No active synovitis or deformity. Full ROM.  Normal  strength. mildy tender at R 1rst CMC with circumduction  Skin: Resolved: 0.5 cm patch of erythema and scale inside L auricle and scalp  Neuro: nl cranial nerves, strength, sensation, DTRs.   Psych: nl judgement, orientation, memory, affect.         Data:   Care everywhere results reviewed    Recent Labs   Lab Test  11/17/17   0853  11/30/16   1335  07/13/16   1209   WBC  6.5  5.5  5.8   HGB  13.8  14.0  13.6   HCT  42.2  40.5  40.4   MCV  95  92.7  93   PLT  216  258  211   AST  33  24  18   ALT  40  42  29     RHEUM RESULTS Latest Ref Rng & Units 11/30/2016 5/23/2017 11/17/2017   CRP, INFLAMMATION 0.0 - 8.0 mg/L - - <2.9   AST 0 - 45 U/L 24 - 33   ALT 0 - 50 U/L 42 - 40   ALBUMIN 3.5 - 5.0 g/dL 4.5 - -   WBC 4.0 - 11.0 10e9/L 5.5 - 6.5   RBC 3.8 - 5.2 10e12/L 4.37 - 4.45   HGB 11.7 - 15.7 g/dL 14.0 - 13.8   HCT 35.0 - 47.0 % 40.5 - 42.2   MCV 78 - 100 fl 92.7 - 95   MCHC 31.5 - 36.5 g/dL 34.5 - 32.7   RDW 10.0 - 15.0 % 9.8(A) - 12.4    - 450 10e9/L - - 216   CREATININE 0.52 - 1.04 mg/dL 0.70 - 0.65   GFR ESTIMATE, IF BLACK >60 mL/min/1.7m2 >60 - >90   GFR ESTIMATE >60 mL/min/1.7m2 >60 - >90   QUANTIFERON TB GOLD neg Text - neg -     Reviewed Rheumatology lab flowsheet

## 2017-11-17 ENCOUNTER — OFFICE VISIT (OUTPATIENT)
Dept: RHEUMATOLOGY | Facility: CLINIC | Age: 52
End: 2017-11-17
Attending: INTERNAL MEDICINE
Payer: COMMERCIAL

## 2017-11-17 VITALS
SYSTOLIC BLOOD PRESSURE: 124 MMHG | HEIGHT: 62 IN | OXYGEN SATURATION: 97 % | WEIGHT: 173.2 LBS | BODY MASS INDEX: 31.87 KG/M2 | HEART RATE: 92 BPM | DIASTOLIC BLOOD PRESSURE: 84 MMHG

## 2017-11-17 DIAGNOSIS — L40.50 PSORIATIC ARTHRITIS (H): ICD-10-CM

## 2017-11-17 LAB
ALT SERPL W P-5'-P-CCNC: 40 U/L (ref 0–50)
AST SERPL W P-5'-P-CCNC: 33 U/L (ref 0–45)
CREAT SERPL-MCNC: 0.65 MG/DL (ref 0.52–1.04)
CRP SERPL-MCNC: <2.9 MG/L (ref 0–8)
ERYTHROCYTE [DISTWIDTH] IN BLOOD BY AUTOMATED COUNT: 12.4 % (ref 10–15)
GFR SERPL CREATININE-BSD FRML MDRD: >90 ML/MIN/1.7M2
HCT VFR BLD AUTO: 42.2 % (ref 35–47)
HGB BLD-MCNC: 13.8 G/DL (ref 11.7–15.7)
MCH RBC QN AUTO: 31 PG (ref 26.5–33)
MCHC RBC AUTO-ENTMCNC: 32.7 G/DL (ref 31.5–36.5)
MCV RBC AUTO: 95 FL (ref 78–100)
PLATELET # BLD AUTO: 216 10E9/L (ref 150–450)
RBC # BLD AUTO: 4.45 10E12/L (ref 3.8–5.2)
WBC # BLD AUTO: 6.5 10E9/L (ref 4–11)

## 2017-11-17 PROCEDURE — 84460 ALANINE AMINO (ALT) (SGPT): CPT | Performed by: INTERNAL MEDICINE

## 2017-11-17 PROCEDURE — 86140 C-REACTIVE PROTEIN: CPT | Performed by: INTERNAL MEDICINE

## 2017-11-17 PROCEDURE — 82565 ASSAY OF CREATININE: CPT | Performed by: INTERNAL MEDICINE

## 2017-11-17 PROCEDURE — 85027 COMPLETE CBC AUTOMATED: CPT | Performed by: INTERNAL MEDICINE

## 2017-11-17 PROCEDURE — 84450 TRANSFERASE (AST) (SGOT): CPT | Performed by: INTERNAL MEDICINE

## 2017-11-17 PROCEDURE — 99212 OFFICE O/P EST SF 10 MIN: CPT | Mod: ZF

## 2017-11-17 PROCEDURE — 36415 COLL VENOUS BLD VENIPUNCTURE: CPT | Performed by: INTERNAL MEDICINE

## 2017-11-17 ASSESSMENT — PAIN SCALES - GENERAL: PAINLEVEL: NO PAIN (0)

## 2017-11-17 NOTE — NURSING NOTE
"Chief Complaint   Patient presents with     RECHECK     Follow up Psoriatic arthritis.       Initial /84  Pulse 92  Ht 1.575 m (5' 2\")  Wt 78.6 kg (173 lb 3.2 oz)  SpO2 97%  BMI 31.68 kg/m2 Estimated body mass index is 31.68 kg/(m^2) as calculated from the following:    Height as of this encounter: 1.575 m (5' 2\").    Weight as of this encounter: 78.6 kg (173 lb 3.2 oz).  Medication Reconciliation: complete  Jessy Reeder., CMA    "

## 2017-11-17 NOTE — LETTER
"11/17/2017      RE: Veronica Sen  [address] [place] [address]       Rheumatology Visit     Veronica Sen MRN# [id number]   YOB: 1965 Age: 51 year old     Date of Visit: November 17, 2017  Primary care provider: Aide Mccord       Assessment and Plan:   # Psoriatic arthritis/spondyloarthropathy (oligoarthritis; strong family history of psoriasis; 9-15 hand xray erosion): Steroid sensitive joint pain is improved over the past 5 weeks. With marked diminution in ADL-disrupting arthralgia. Exam reveals minimal joint tenderness. In lab, 11-17 CBC, LFT, CRP, Cr were WNL.    I think that inflammatory arthropathy has improved in association with anti-IL-17 mAb. Although she has seronegative SpA with minimal cutaneous burden, I think that continued immunomodulatory Rx for a working dx of PsA will benefit her. We agreed upon the following plan:    -- D/c golimumab  -- Continue cosentyx 150 mg sq weekly.  I also discussed with the patient and her  potential of using intermittent steroid \"burst and taper\" therapy for significant flares of inflammatory arthritis. I recommend using prednisone no more than once every 2 to 3 months in this fashion.  -- naproxen 440 mg daily prn    Given the patient's chronic use of naproxen, I recommended complete blood count, creatinine, and transaminases today.    2. R thumb pain, with De Quervain's tenosynovitis: she is more comfortable with exercises and splinting. I recommend continued naproxen 500 mg bid prn recurrent pain.    RTC 4 mos.    Joe Rodriguez MD  Staff Rheumatologist, [facility]  Pager [phone]            Active Problem List:     Patient Active Problem List    Diagnosis Date Noted     Psoriatic arthritis (H) 04/11/2016     Priority: Medium            History of Present Illness:   Veronica Sen presents for follow-up psoriatic arthritis vs erosive OA. Last seen 8-17, when trial of golimumab was started    She has been seen in 2013 by " "Dr. Madrigal, Rheum, who diagnosed \"erosive osteoarthritis\". TSH, ESR, KRYSTA, Lyme were all negative in 2014. Started Mtx 15 mg weekly in 9-15. Discontinued mtx in 2-16 d/t lack of efficacy. SSZ d/c'd in 3-16 d/t GI intolerance. Humira trial 2016: no efficacy. Otezla 1-17 start--d/c due to headaches. Started goliumab in 8-17, stopped due to no efficacy. Cosentyx started 10-17.    Interval history 8-17:    She has been doing better for ~ 2 months. She notes decreased aching sleep disrupting pain late at night. She still has low grade pain in hands, and still has trouble opening jars, small motor tasks like grabbing a hairbrush. No visible swelling. There is some stiffness after immobility, but only 5 minutes of morning stiffness.    She had a burst of prednisone in late July for sore throat--she noted temporary improvement in joint pain. She takes naproxen 220-440 mg prn     She started otezla 1-17.  She stopped within a month due to severe headaches, and she could not restart the medication without resumption of headaches    The base of the R thumb is improved; she associates improvement in writing, sewing, handwork of all kinds, with OTx-prescribed exercises and occasional use of splint. She still notes that  opening jars is still adversely affected by the R thumb pain.    She started lipitor 12-16 for increased cholesterol.  No recurrence of hairline scalp psoriasis spot.    Interval history 11-17:    She started cosentyx in 10-17. Within 2 weeks, she noted significant improvement in lower extremity joint \"guarding\" and finger swelling--here rings go off much easier. Her  notes easier movement and walking in the mornings.  She notes reduced nightime discomfort and less hand pain with activity.  The former scaly patch on her left scalp has disappeared.  cosentyx injections sting, but she has completed he loading period without difficulty.  She uses imitrex 3-4 times per week, a modest increase from before " "starting cosentyx.  L knee meniscus torn several months ago. Scope is planned in 12-17.         Review of Systems:   Review Of Systems  Constitutional: mild cough, nasal congestion  Skin: rash on scalp improved  Eyes: continued dry eyes, managed with systane  Ears/Nose/Throat: HA : migraine controlled with imitrex  Respiratory: No shortness of breath, dyspnea on exertion, cough, or hemoptysis  Cardiovascular: negative  Gastrointestinal: adhesion related abd pain-tenderness.  Genitourinary: negative  Musculoskeletal: HPI  Neurologic: negative  Psychiatric: negative  Hematologic/Lymphatic/Immunologic: no LAP recently.  Endocrine: negative          Past Medical History:   No past medical history on file.  No past surgical history on file.  S/p bowel resection in 1-15 after ruptured diverticulum and abscess. She had colostomy, s/p takedown in 4-15. No evidence of colitis on Path. No colitis on colonoscopy    S/p childhood tonsilitis  S/p hysterectomy 2011  S/p C section X 3  Migraine HA since age 14  GERD, controlled with omeprazole  Has laryngospasm    Spondyloarthropathy: She had been seen in 2013 by Dr. Madrigal, Rheum, who diagnosed \"erosive osteoarthritis\". TSH, ESR, KRYSTA, Lyme were all negative in 2014. Started Mtx 15 mg weekly in 9-15. Discontinued mtx in 2-16 d/t lack of efficacy. SSZ d/c'd in 3-16 d/t GI intolerance. Humira trial 2016: no efficacy. Otezla 7-16 start. Unable to tolerated Otezla due to headaches. Started a trial of Simponi in July 2017.       Social History:     Social History     Occupational History     Not on file.     Social History Main Topics     Smoking status: Never Smoker     Smokeless tobacco: Not on file     Alcohol use Not on file     Drug use: Not on file     Sexual activity: Not on file     3 children  Never smoker  Drinks beer 3 x per week  Enjoys walking 2.5 miles 3X per week--formerly ran half-marathons  Works as a nurse part-time  ; spouse is a family physician       " "Family History:   No family history on file.  Mother and brother have psoriasis  Mother and father have arthritis in hands/ankles--onset in '70s.  2 children have central hypothyroidism  Nephew had growth hormone defcy  Youngest son has sz disorder.  Middle daughter has torn hip capsule.       Allergies:     Allergies   Allergen Reactions     Hydrocodone Nausea and Vomiting and Itching     Morphine Nausea and Vomiting and Itching   Sulfasalazine: bloating         Medications:     Current Outpatient Prescriptions   Medication Sig Dispense Refill     secukinumab (COSENTYX SENSOREADY PEN) 150 MG/ML Sensoready pen Inject 1 mL (150 mg) Subcutaneous every 28 days Hold for signs of infection, and then seek medical attention. 3 mL 1     acetaminophen (TYLENOL) 325 MG tablet        atorvastatin (LIPITOR) 10 MG tablet        ibuprofen (ADVIL/MOTRIN) 200 MG tablet Take by mouth as needed       secukinumab (COSENTYX SENSOREADY PEN) 150 MG/ML Sensoready pen Inject 1 mL (150 mg) Subcutaneous once a week At weeks 0, 1, 2, 3, and 4 and every 4 weeks there after loading dose. Hold for signs of infection, and then seek medical attention. 5 mL 0     OMEPRAZOLE PO Take 20 mg by mouth every morning       VITAMIN D, CHOLECALCIFEROL, PO Take 15,000 Units by mouth once a week       Multiple Vitamins-Minerals (MULTIVITAMIN PO) Take 1 tablet by mouth daily       SUMAtriptan Succinate (IMITREX PO) Take 50 mg by mouth as needed for migraine       Cetirizine HCl (ZYRTEC ALLERGY PO) Take 10 mg by mouth as needed     Vit D 2000 meq daily  Uses imitrex 3X/month  Uses naproxen 440 mg daily.         Physical Exam:   Blood pressure 124/84, pulse 92, height 1.575 m (5' 2\"), weight 78.6 kg (173 lb 3.2 oz), SpO2 97 %.  Wt Readings from Last 4 Encounters:   11/17/17 78.6 kg (173 lb 3.2 oz)   08/02/17 78.9 kg (173 lb 14.4 oz)   02/01/17 80.7 kg (178 lb)   07/13/16 77.7 kg (171 lb 6.4 oz)     Constitutional: mild obesity; appearing younger than stated age; " cooperative  Eyes: nl EOM, PERRLA, vision, conjunctiva, sclera  ENT: nl external ears, nose, hearing, lips, teeth, gums, throat  No mucous membrane lesions, normal saliva pool  Neck: no mass or thyroid enlargement  Resp: lungs clear to auscultation, nl to palpation  CV: RRR, no murmurs, rubs or gallops, no edema  GI: no ABD mass or tenderness, no HSM  : not tested  Lymph: no cervical, supraclavicular, inguinal or epitrochlear nodes  MS: All TMJ, neck, shoulder, elbow, wrist, MCP/PIP/DIP, spine, hip, knee, ankle, and foot MTP/IP joints were examined. No active synovitis or deformity. Full ROM.  Normal  strength. mildy tender at R 1rst CMC with circumduction  Skin: Resolved: 0.5 cm patch of erythema and scale inside L auricle and scalp  Neuro: nl cranial nerves, strength, sensation, DTRs.   Psych: nl judgement, orientation, memory, affect.         Data:   Care everywhere results reviewed    Recent Labs   Lab Test  11/17/17   0853  11/30/16   1335  07/13/16   1209   WBC  6.5  5.5  5.8   HGB  13.8  14.0  13.6   HCT  42.2  40.5  40.4   MCV  95  92.7  93   PLT  216  258  211   AST  33  24  18   ALT  40  42  29     RHEUM RESULTS Latest Ref Rng & Units 11/30/2016 5/23/2017 11/17/2017   CRP, INFLAMMATION 0.0 - 8.0 mg/L - - <2.9   AST 0 - 45 U/L 24 - 33   ALT 0 - 50 U/L 42 - 40   ALBUMIN 3.5 - 5.0 g/dL 4.5 - -   WBC 4.0 - 11.0 10e9/L 5.5 - 6.5   RBC 3.8 - 5.2 10e12/L 4.37 - 4.45   HGB 11.7 - 15.7 g/dL 14.0 - 13.8   HCT 35.0 - 47.0 % 40.5 - 42.2   MCV 78 - 100 fl 92.7 - 95   MCHC 31.5 - 36.5 g/dL 34.5 - 32.7   RDW 10.0 - 15.0 % 9.8(A) - 12.4    - 450 10e9/L - - 216   CREATININE 0.52 - 1.04 mg/dL 0.70 - 0.65   GFR ESTIMATE, IF BLACK >60 mL/min/1.7m2 >60 - >90   GFR ESTIMATE >60 mL/min/1.7m2 >60 - >90   QUANTIFERON TB GOLD neg Text - neg -     Reviewed Rheumatology lab flowsheet    Joe Rodriguez MD

## 2017-11-17 NOTE — MR AVS SNAPSHOT
After Visit Summary   11/17/2017    Veronica Sen    MRN: [id number]           Patient Information     Date Of Birth          1965        Visit Information        Provider Department      11/17/2017 8:00 AM Joe Rodriguez MD [facility]        Today's Diagnoses     Psoriatic arthritis (H)          Care Instructions    Dx: psoriatic arthritis  Plan: continue cosentyx 150 mg every 28 days  Around the time of L knee meniscus surgery: plan the surgery at the end of the dosing interval, and wait 14 days before resuming the medication.          Follow-ups after your visit        Follow-up notes from your care team     Return in about 4 months (around 3/17/2018).      Your next 10 appointments already scheduled     Nov 17, 2017  8:45 AM CST   Lab with  LAB   [facility] ([facility])    [address] [place] [id number] [phone]            Mar 16, 2018  9:30 AM CDT   (Arrive by 9:15 AM)   Return Visit with Joe Rodriguez MD   [facility] ([facility])    [address] [place] [id number] [phone]              Future tests that were ordered for you today     Open Future Orders        Priority Expected Expires Ordered    ALT Routine  11/17/2018 11/17/2017    AST Routine  11/17/2018 11/17/2017    Creatinine Routine  11/17/2018 11/17/2017    CBC with platelets Routine  11/17/2018 11/17/2017            Who to contact     If you have questions or need follow up information about today's clinic visit or your schedule please contact [facility] RHE[facility] directly at [phone].  Normal or non-critical lab and imaging results will be communicated to you by MyChart, letter or phone within 4 business days after the clinic has received the results. If you do not hear from us within 7 days, please contact the clinic through MyChart or phone. If you have a critical or  "abnormal lab result, we will notify you by phone as soon as possible.  Submit refill requests through [employer] or call your pharmacy and they will forward the refill request to us. Please allow 3 business days for your refill to be completed.          Additional Information About Your Visit        [employer] Information     [employer] gives you secure access to your electronic health record. If you see a primary care provider, you can also send messages to your care team and make appointments. If you have questions, please call your primary care clinic.  If you do not have a primary care provider, please call [phone] and they will assist you.        Care EveryWhere ID     This is your Care EveryWhere ID. This could be used by other organizations to access your [facility] medical records  [id number]        Your Vitals Were     Pulse Height Pulse Oximetry BMI (Body Mass Index)          92 1.575 m (5' 2\") 97% 31.68 kg/m2         Blood Pressure from Last 3 Encounters:   11/17/17 124/84   08/02/17 121/78   02/01/17 126/84    Weight from Last 3 Encounters:   11/17/17 78.6 kg (173 lb 3.2 oz)   08/02/17 78.9 kg (173 lb 14.4 oz)   02/01/17 80.7 kg (178 lb)                 Where to get your medicines      Call your pharmacy to confirm that your medication is ready for pickup. It may take up to 24 hours for them to receive the prescription. If the prescription is not ready within 3 business days, please contact your clinic or your provider.     We will let you know when these medications are ready. If you don't hear back within 3 business days, please contact us.     secukinumab 150 MG/ML Sensoready pen          Primary Care Provider    Aide Mccord       No address on file        Equal Access to Services     MIKA ADKINS : Hadii gordon Crockett, casie castillo, gallo bal. So [facility] [phone].    ATENCIÓN: Si habla español, tiene a duffy disposición servicios " tashia de asistencia lingüística. Shaniqua cordova [phone].    We comply with applicable federal civil rights laws and [place] laws. We do not discriminate on the basis of race, color, national origin, age, disability, sex, sexual orientation, or gender identity.            Thank you!     Thank you for choosing [facility] RHEUMATOLOG[facility]  for your care. Our goal is always to provide you with excellent care. Hearing back from our patients is one way we can continue to improve our services. Please take a few minutes to complete the written survey that you may receive in the mail after your visit with us. Thank you!             Your Updated Medication List - Protect others around you: Learn how to safely use, store and throw away your medicines at www.disposemymeds.org.          This list is accurate as of: 11/17/17  8:25 AM.  Always use your most recent med list.                   Brand Name Dispense Instructions for use Diagnosis    acetaminophen 325 MG tablet    TYLENOL          atorvastatin 10 MG tablet    LIPITOR          ibuprofen 200 MG tablet    ADVIL/MOTRIN     Take by mouth as needed        IMITREX PO      Take 50 mg by mouth as needed for migraine        MULTIVITAMIN PO      Take 1 tablet by mouth daily        OMEPRAZOLE PO      Take 20 mg by mouth every morning        * secukinumab 150 MG/ML Sensoready pen    COSENTYX SENSOREADY PEN    5 mL    Inject 1 mL (150 mg) Subcutaneous once a week At weeks 0, 1, 2, 3, and 4 and every 4 weeks there after loading dose. Hold for signs of infection, and then seek medical attention.    Psoriatic arthritis (H)       * secukinumab 150 MG/ML Sensoready pen    COSENTYX SENSOREADY PEN    3 mL    Inject 1 mL (150 mg) Subcutaneous every 28 days Hold for signs of infection, and then seek medical attention.    Psoriatic arthritis (H)       VITAMIN D (CHOLECALCIFEROL) PO      Take 15,000 Units by mouth once a week        ZYRTEC ALLERGY PO      Take 10 mg by mouth as needed        *  Notice:  This list has 2 medication(s) that are the same as other medications prescribed for you. Read the directions carefully, and ask your doctor or other care provider to review them with you.

## 2017-11-17 NOTE — PATIENT INSTRUCTIONS
Dx: psoriatic arthritis  Plan: continue cosentyx 150 mg every 28 days  Around the time of L knee meniscus surgery: plan the surgery at the end of the dosing interval, and wait 14 days before resuming the medication.

## 2018-01-08 ENCOUNTER — TELEPHONE (OUTPATIENT)
Dept: RHEUMATOLOGY | Facility: CLINIC | Age: 53
End: 2018-01-08

## 2018-01-08 NOTE — TELEPHONE ENCOUNTER
PA approved.  Effective date:12/05/2017- 01/05/2019  PA reference #:[id number]  Pt. notified:   Yes   Pt. informed directly.    [facility] Prior Authorization Team   Phone: [phone]  Fax: [phone]

## 2018-06-25 NOTE — PROGRESS NOTES
[facility]  Rheumatology Clinic  Joe Rodriguez MD  2018     Name: Veronica Sen  MRN: [id number]  Age: 52 year old  : 1965  Referring provider: Aide Mccord     Assessment and Plan:    # Psoriatic arthritis/spondyloarthropathy (oligoarthritis; strong family history of psoriasis; 9-15 hand xray erosion): relapsing/remiting polyarthralgia affecting predominantly small and medium sized joints continues. Symptoms are somewhat less intense but no less frequent compared with the time prior to cosentyx. Exam reveals tenderness without gross synovitis in multiple MCPs and MTPs, and no evidence of psoriasis. Labs in 2017 were remarkable for normal LFTs, creatinine, CBC, and CRP.    I think that use of cosentyx since 2017 has been associated with significant but incomplete improvement in joint pain. As there is still significant morning stiffness and joint tenderness on examination, however, I am suspicious that more immunomodulatory therapy is needed. We talked about alternative strategies of achieving this. After reviewing dosing information literature, I recommend that the patient increase cosentyx dose from 150mg to 300mg every 30 days. If she has improved relief, but still notes dose-related cycling symptoms, I will recommend an increase in frequency of injections to every 3 weeks as well. We also discussed possible addition of methotrexate, as there is some evidence that combination methotrexate and cosentyx may be effective in treating psoriatic arthritis. I will obtain CBC and CRP today. I will arrange follow up in 4 months time.    Plan:  - CRP inflammation  - CBC with platelets  - secukinumab (COSENTYX SENSOREADY PEN) 150 MG/ML Sensoready pen  Dispense: 6 mL; Refill: 2     Follow-up: Return in about 4 months (around 10/27/2018).     HPI:   Veronica Sen has a history of psoriatic arthritis and presents for follow up. She has been seen in  by Dr. Madrigal, Rheum, who  "diagnosed \"erosive osteoarthritis\". TSH, ESR, KRYSTA, Lyme were all negative in 2014. Started Mtx 15 mg weekly in 9-15. Discontinued mtx in 2-16 d/t lack of efficacy. SSZ d/c'd in 3-16 d/t GI intolerance. Humira trial 2016: no efficacy. Otezla 1-17 start--d/c due to headaches. Started goliumab in 8-17, stopped due to no efficacy. Cosentyx started 10-17, increased from 150mg to 300mg in 6/18. She was last seen on 11/17/17 at which time she was doing well. The plan was to continue cosentyx 150mg subcutaneously weekly, with naproxen 440mg or prednisone bursts if needed.    Today, the patient reports thinking that cosentyx worked very well at first, but in actuality, her other pain medications for her knee meniscus tear and surgery were the source of pain relief. Her knee surgery was on December 21st 2017. After she stopped her post-surgery medications, she noticed pain in her elbows, hands, and feet described as a pressure. She states that her neck is constantly sore as well, with occasional burning pain. Her morning stiffness lasts around 15 minutes. She manages her pain with ibuprofen and extra strength tylenol, but notes that she tries to minimize of these medications as larger doses cause her GERD. Her nighttime pain has improved significantly after starting cosentyx, but could be attributed to her nightly naproxen use as well. She reports getting cosentyx injections every month, and expresses interest in getting the injections more frequently because the effects start to wear off around 3 weeks. In addition, her injections often do not provide complete relief for her. She notes that her injections are often accompanied by stinging pain and that she does not like how slowly her auto injector injects. Her  notes that she has to shuffle around in the mornings for 5-10 minutes and that she has difficulty with fine motor movements associated with gardening and such. She also wears out easily from doing fine motor " "movements.     Review of Systems:   Pertinent items are noted in HPI, remainder of complete ROS is negative.    No recent problems with hearing or vision. No swallowing problems.   No breathing difficulty, shortness of breath, coughing, or wheezing  No chest pain or palpitations  No heart burn, indigestion, vomiting, diarrhea  No urination problems, no bloody, cloudy urine, no dysuria  No numbing, tingling, weakness  No headaches or confusion  No rashes. No easy bleeding or bruising.   +GERD attributed to ibuprofen/tylenol use    Active Medications:     Current Outpatient Prescriptions:      acetaminophen (TYLENOL) 325 MG tablet, , Disp: , Rfl:      atorvastatin (LIPITOR) 10 MG tablet, , Disp: , Rfl:      Cetirizine HCl (ZYRTEC ALLERGY PO), Take 10 mg by mouth as needed, Disp: , Rfl:      ibuprofen (ADVIL/MOTRIN) 200 MG tablet, Take by mouth as needed, Disp: , Rfl:      Multiple Vitamins-Minerals (MULTIVITAMIN PO), Take 1 tablet by mouth daily, Disp: , Rfl:      OMEPRAZOLE PO, Take 20 mg by mouth every morning, Disp: , Rfl:      secukinumab (COSENTYX SENSOREADY PEN) 150 MG/ML Sensoready pen, Inject 2 mLs (300 mg) Subcutaneous every 28 days Hold for signs of infection, and then seek medical attention., Disp: 6 mL, Rfl: 2     SUMAtriptan Succinate (IMITREX PO), Take 50 mg by mouth as needed for migraine, Disp: , Rfl:      VITAMIN D, CHOLECALCIFEROL, PO, Take 15,000 Units by mouth once a week, Disp: , Rfl:       Allergies:   Hydrocodone and Morphine      Past Medical History:  Psoriatic arthritis     Past Surgical History:  History reviewed. No pertinent surgical history.    Family History:   History reviewed. No pertinent family history.      Social History:   No smoking  Unknown smokeless tobacco or alcohol use  ; lives with  and children     Physical Exam:   /64  Pulse 77  Temp 97.8  F (36.6  C) (Oral)  Ht 1.575 m (5' 2\")  Wt 76 kg (167 lb 8 oz)  SpO2 97%  BMI 30.64 kg/m2   Wt Readings from " Last 4 Encounters:   06/27/18 76 kg (167 lb 8 oz)   11/17/17 78.6 kg (173 lb 3.2 oz)   08/02/17 78.9 kg (173 lb 14.4 oz)   02/01/17 80.7 kg (178 lb)   Constitutional: Well-developed, appearing stated age; cooperative  Eyes: Normal EOM, PERRLA, vision, conjunctiva, sclera  ENT: Normal external ears, nose, hearing, lips, teeth, gums, throat. No mucous membrane lesions, normal saliva pool  Neck: No mass or thyroid enlargement  Resp: Lungs clear to auscultation, nl to palpation  CV: RRR, no murmurs, rubs or gallops, no edema  GI: No ABD mass or tenderness, no HSM  Lymph: No cervical, supraclavicular, inguinal or epitrochlear nodes  MS: Subtle changes in angulation at right 2nd and 5th DIPs. Tenderness in multiple MCPs. Right wrist lacks 10 degrees of extension. Tenderness at 2nd and 3rd MTPs on the left, and the 2nd MTP on the right. Tenderness at the medial instep on the right. The TMJ, neck, shoulder, elbow, MCP/PIP, spine, hip, knee, and foot IP joints were examined and found normal. No active synovitis. Normal  strength. No dactylitis,  tenosynovitis, enthesopathy.  Skin: No nail pitting, alopecia, rash, nodules or lesions  Neuro: Normal cranial nerves, strength, sensation, DTRs.   Psych: Normal judgement, orientation, memory, affect.    Laboratory:   Component      Latest Ref Rng & Units 11/17/2017   WBC      4.0 - 11.0 10e9/L 6.5   RBC Count      3.8 - 5.2 10e12/L 4.45   Hemoglobin      11.7 - 15.7 g/dL 13.8   Hematocrit      35.0 - 47.0 % 42.2   MCV      78 - 100 fl 95   MCH      26.5 - 33.0 pg 31.0   MCHC      31.5 - 36.5 g/dL 32.7   RDW      10.0 - 15.0 % 12.4   Platelet Count      150 - 450 10e9/L 216   Creatinine      0.52 - 1.04 mg/dL 0.65   GFR Estimate      >60 mL/min/1.7m2 >90   GFR Estimate If Black      >60 mL/min/1.7m2 >90   CRP Inflammation      0.0 - 8.0 mg/L <2.9   ALT      0 - 50 U/L 40   AST      0 - 45 U/L 33     Scribe Disclosure:   I, Yazan Cleaning, am serving as a scribe to document services  personally performed by Joe Rodriguez MD at this visit, based upon the provider's statements to me. All documentation has been reviewed by the aforementioned provider prior to being entered into the official medical record.     Portions of this medical record were completed by a scribe. UPON MY REVIEW AND AUTHENTICATION BY ELECTRONIC SIGNATURE, this confirms (a) I performed the applicable clinical services, and (b) the record is accurate.  Joe Rodriguez MD  Staff RheumatologistBR[facility]ON

## 2018-06-27 ENCOUNTER — OFFICE VISIT (OUTPATIENT)
Dept: RHEUMATOLOGY | Facility: CLINIC | Age: 53
End: 2018-06-27
Attending: INTERNAL MEDICINE
Payer: COMMERCIAL

## 2018-06-27 VITALS
WEIGHT: 167.5 LBS | DIASTOLIC BLOOD PRESSURE: 64 MMHG | TEMPERATURE: 97.8 F | HEIGHT: 62 IN | HEART RATE: 77 BPM | OXYGEN SATURATION: 97 % | SYSTOLIC BLOOD PRESSURE: 123 MMHG | BODY MASS INDEX: 30.82 KG/M2

## 2018-06-27 DIAGNOSIS — L40.50 PSORIATIC ARTHRITIS (H): ICD-10-CM

## 2018-06-27 DIAGNOSIS — L40.50 PSORIATIC ARTHRITIS (H): Primary | ICD-10-CM

## 2018-06-27 DIAGNOSIS — Z79.899 ENCOUNTER FOR LONG-TERM (CURRENT) USE OF MEDICATIONS: ICD-10-CM

## 2018-06-27 LAB
ALT SERPL W P-5'-P-CCNC: 32 U/L (ref 0–50)
AST SERPL W P-5'-P-CCNC: 22 U/L (ref 0–45)
CREAT SERPL-MCNC: 0.66 MG/DL (ref 0.52–1.04)
CRP SERPL-MCNC: <2.9 MG/L (ref 0–8)
ERYTHROCYTE [DISTWIDTH] IN BLOOD BY AUTOMATED COUNT: 12 % (ref 10–15)
GFR SERPL CREATININE-BSD FRML MDRD: >90 ML/MIN/1.7M2
HCT VFR BLD AUTO: 43.2 % (ref 35–47)
HGB BLD-MCNC: 14.1 G/DL (ref 11.7–15.7)
MCH RBC QN AUTO: 30.4 PG (ref 26.5–33)
MCHC RBC AUTO-ENTMCNC: 32.6 G/DL (ref 31.5–36.5)
MCV RBC AUTO: 93 FL (ref 78–100)
PLATELET # BLD AUTO: 212 10E9/L (ref 150–450)
RBC # BLD AUTO: 4.64 10E12/L (ref 3.8–5.2)
WBC # BLD AUTO: 5.9 10E9/L (ref 4–11)

## 2018-06-27 PROCEDURE — 86140 C-REACTIVE PROTEIN: CPT | Performed by: INTERNAL MEDICINE

## 2018-06-27 PROCEDURE — 36415 COLL VENOUS BLD VENIPUNCTURE: CPT | Performed by: INTERNAL MEDICINE

## 2018-06-27 PROCEDURE — 85027 COMPLETE CBC AUTOMATED: CPT | Performed by: INTERNAL MEDICINE

## 2018-06-27 PROCEDURE — G0463 HOSPITAL OUTPT CLINIC VISIT: HCPCS | Mod: ZF

## 2018-06-27 PROCEDURE — 82565 ASSAY OF CREATININE: CPT | Performed by: INTERNAL MEDICINE

## 2018-06-27 PROCEDURE — 84460 ALANINE AMINO (ALT) (SGPT): CPT | Performed by: INTERNAL MEDICINE

## 2018-06-27 PROCEDURE — 84450 TRANSFERASE (AST) (SGOT): CPT | Performed by: INTERNAL MEDICINE

## 2018-06-27 ASSESSMENT — PAIN SCALES - GENERAL: PAINLEVEL: MILD PAIN (3)

## 2018-06-27 NOTE — LETTER
2018      RE: Veronica Sen  [address] [place] [id number]       [facility]  Rheumatology Clinic  Joe Rodriguez MD  2018     Name: Veronica Sen  MRN: [id number]  Age: 52 year old  : 1965  Referring provider: Aide Mccord     Assessment and Plan:    # Psoriatic arthritis/spondyloarthropathy (oligoarthritis; strong family history of psoriasis; 9-15 hand xray erosion): relapsing/remiting polyarthralgia affecting predominantly small and medium sized joints continues. Symptoms are somewhat less intense but no less frequent compared with the time prior to cosentyx. Exam reveals tenderness without gross synovitis in multiple MCPs and MTPs, and no evidence of psoriasis. Labs in 2017 were remarkable for normal LFTs, creatinine, CBC, and CRP.    I think that use of cosentyx since 2017 has been associated with significant but incomplete improvement in joint pain. As there is still significant morning stiffness and joint tenderness on examination, however, I am suspicious that more immunomodulatory therapy is needed. We talked about alternative strategies of achieving this. After reviewing dosing information literature, I recommend that the patient increase cosentyx dose from 150mg to 300mg every 30 days. If she has improved relief, but still notes dose-related cycling symptoms, I will recommend an increase in frequency of injections to every 3 weeks as well. We also discussed possible addition of methotrexate, as there is some evidence that combination methotrexate and cosentyx may be effective in treating psoriatic arthritis. I will obtain CBC and CRP today. I will arrange follow up in 4 months time.    Plan:  - CRP inflammation  - CBC with platelets  - secukinumab (COSENTYX SENSOREADY PEN) 150 MG/ML Sensoready pen  Dispense: 6 mL; Refill: 2     Follow-up: Return in about 4 months (around 10/27/2018).     HPI:   Veronica Sen has a history of psoriatic arthritis  "and presents for follow up. She has been seen in 2013 by Dr. Madrigal, Rheum, who diagnosed \"erosive osteoarthritis\". TSH, ESR, KRYSTA, Lyme were all negative in 2014. Started Mtx 15 mg weekly in 9-15. Discontinued mtx in 2-16 d/t lack of efficacy. SSZ d/c'd in 3-16 d/t GI intolerance. Humira trial 2016: no efficacy. Otezla 1-17 start--d/c due to headaches. Started goliumab in 8-17, stopped due to no efficacy. Cosentyx started 10-17, increased from 150mg to 300mg in 6/18. She was last seen on 11/17/17 at which time she was doing well. The plan was to continue cosentyx 150mg subcutaneously weekly, with naproxen 440mg or prednisone bursts if needed.    Today, the patient reports thinking that cosentyx worked very well at first, but in actuality, her other pain medications for her knee meniscus tear and surgery were the source of pain relief. Her knee surgery was on December 21st 2017. After she stopped her post-surgery medications, she noticed pain in her elbows, hands, and feet described as a pressure. She states that her neck is constantly sore as well, with occasional burning pain. Her morning stiffness lasts around 15 minutes. She manages her pain with ibuprofen and extra strength tylenol, but notes that she tries to minimize of these medications as larger doses cause her GERD. Her nighttime pain has improved significantly after starting cosentyx, but could be attributed to her nightly naproxen use as well. She reports getting cosentyx injections every month, and expresses interest in getting the injections more frequently because the effects start to wear off around 3 weeks. In addition, her injections often do not provide complete relief for her. She notes that her injections are often accompanied by stinging pain and that she does not like how slowly her auto injector injects. Her  notes that she has to shuffle around in the mornings for 5-10 minutes and that she has difficulty with fine motor movements " associated with gardening and such. She also wears out easily from doing fine motor movements.     Review of Systems:   Pertinent items are noted in HPI, remainder of complete ROS is negative.    No recent problems with hearing or vision. No swallowing problems.   No breathing difficulty, shortness of breath, coughing, or wheezing  No chest pain or palpitations  No heart burn, indigestion, vomiting, diarrhea  No urination problems, no bloody, cloudy urine, no dysuria  No numbing, tingling, weakness  No headaches or confusion  No rashes. No easy bleeding or bruising.   +GERD attributed to ibuprofen/tylenol use    Active Medications:     Current Outpatient Prescriptions:      acetaminophen (TYLENOL) 325 MG tablet, , Disp: , Rfl:      atorvastatin (LIPITOR) 10 MG tablet, , Disp: , Rfl:      Cetirizine HCl (ZYRTEC ALLERGY PO), Take 10 mg by mouth as needed, Disp: , Rfl:      ibuprofen (ADVIL/MOTRIN) 200 MG tablet, Take by mouth as needed, Disp: , Rfl:      Multiple Vitamins-Minerals (MULTIVITAMIN PO), Take 1 tablet by mouth daily, Disp: , Rfl:      OMEPRAZOLE PO, Take 20 mg by mouth every morning, Disp: , Rfl:      secukinumab (COSENTYX SENSOREADY PEN) 150 MG/ML Sensoready pen, Inject 2 mLs (300 mg) Subcutaneous every 28 days Hold for signs of infection, and then seek medical attention., Disp: 6 mL, Rfl: 2     SUMAtriptan Succinate (IMITREX PO), Take 50 mg by mouth as needed for migraine, Disp: , Rfl:      VITAMIN D, CHOLECALCIFEROL, PO, Take 15,000 Units by mouth once a week, Disp: , Rfl:       Allergies:   Hydrocodone and Morphine      Past Medical History:  Psoriatic arthritis     Past Surgical History:  History reviewed. No pertinent surgical history.    Family History:   History reviewed. No pertinent family history.      Social History:   No smoking  Unknown smokeless tobacco or alcohol use  ; lives with  and children     Physical Exam:   /64  Pulse 77  Temp 97.8  F (36.6  C) (Oral)  Ht  "1.575 m (5' 2\")  Wt 76 kg (167 lb 8 oz)  SpO2 97%  BMI 30.64 kg/m2   Wt Readings from Last 4 Encounters:   06/27/18 76 kg (167 lb 8 oz)   11/17/17 78.6 kg (173 lb 3.2 oz)   08/02/17 78.9 kg (173 lb 14.4 oz)   02/01/17 80.7 kg (178 lb)   Constitutional: Well-developed, appearing stated age; cooperative  Eyes: Normal EOM, PERRLA, vision, conjunctiva, sclera  ENT: Normal external ears, nose, hearing, lips, teeth, gums, throat. No mucous membrane lesions, normal saliva pool  Neck: No mass or thyroid enlargement  Resp: Lungs clear to auscultation, nl to palpation  CV: RRR, no murmurs, rubs or gallops, no edema  GI: No ABD mass or tenderness, no HSM  Lymph: No cervical, supraclavicular, inguinal or epitrochlear nodes  MS: Subtle changes in angulation at right 2nd and 5th DIPs. Tenderness in multiple MCPs. Right wrist lacks 10 degrees of extension. Tenderness at 2nd and 3rd MTPs on the left, and the 2nd MTP on the right. Tenderness at the medial instep on the right. The TMJ, neck, shoulder, elbow, MCP/PIP, spine, hip, knee, and foot IP joints were examined and found normal. No active synovitis. Normal  strength. No dactylitis,  tenosynovitis, enthesopathy.  Skin: No nail pitting, alopecia, rash, nodules or lesions  Neuro: Normal cranial nerves, strength, sensation, DTRs.   Psych: Normal judgement, orientation, memory, affect.    Laboratory:   Component      Latest Ref Rng & Units 11/17/2017   WBC      4.0 - 11.0 10e9/L 6.5   RBC Count      3.8 - 5.2 10e12/L 4.45   Hemoglobin      11.7 - 15.7 g/dL 13.8   Hematocrit      35.0 - 47.0 % 42.2   MCV      78 - 100 fl 95   MCH      26.5 - 33.0 pg 31.0   MCHC      31.5 - 36.5 g/dL 32.7   RDW      10.0 - 15.0 % 12.4   Platelet Count      150 - 450 10e9/L 216   Creatinine      0.52 - 1.04 mg/dL 0.65   GFR Estimate      >60 mL/min/1.7m2 >90   GFR Estimate If Black      >60 mL/min/1.7m2 >90   CRP Inflammation      0.0 - 8.0 mg/L <2.9   ALT      0 - 50 U/L 40   AST      0 - 45 " U/L 33     Scribe Disclosure:   I, Yazan Hermila, am serving as a scribe to document services personally performed by Joe Rodriguez MD at this visit, based upon the provider's statements to me. All documentation has been reviewed by the aforementioned provider prior to being entered into the official medical record.     Portions of this medical record were completed by a scribe. UPON MY REVIEW AND AUTHENTICATION BY ELECTRONIC SIGNATURE, this confirms (a) I performed the applicable clinical services, and (b) the record is accurate.  Joe Rodriguez MD  Staff Rheumatologist, [facility]

## 2018-06-27 NOTE — MR AVS SNAPSHOT
After Visit Summary   6/27/2018    Veronica Sen    MRN: [id number]           Patient Information     Date Of Birth          1965        Visit Information        Provider Department      6/27/2018 7:00 AM Joe Rodriguez MD [facility]        Today's Diagnoses     Psoriatic arthritis (H)    -  1      Care Instructions    Dx: psoriatic arthritis, inadequately controlled.  Plan:  Increase cosentyx dose to 300 mg (2 injections) once monthly.            Follow-ups after your visit        Follow-up notes from your care team     Return in about 4 months (around 10/27/2018).      Your next 10 appointments already scheduled     Jun 27, 2018  8:00 AM CDT   Lab with  LAB   [facility] ([facility])    [address]  1st Floor  [facility] [address] [phone]            Oct 31, 2018 10:00 AM CDT   (Arrive by 9:45 AM)   Return Visit with Joe Rodriguez MD   [facility] ([facility])    [address]  [address]  [facility] [facility] [address] [phone]              Future tests that were ordered for you today     Open Future Orders        Priority Expected Expires Ordered    CRP inflammation Routine  6/27/2019 6/27/2018    CBC with platelets Routine  6/27/2019 6/27/2018            Who to contact     If you have questions or need follow up information about today's clinic visit or your schedule please contact [facility] RHE[facility]AT[facility] directly at [phone].  Normal or non-critical lab and imaging results will be communicated to you by MyChart, letter or phone within 4 business days after the clinic has received the results. If you do not hear from us within 7 days, please contact the clinic through MyChart or phone. If you have a critical or abnormal lab result, we will notify you by phone as soon as possible.  Submit refill requests through [employer] or call your pharmacy and they will forward the refill  "request to us. Please allow 3 business days for your refill to be completed.          Additional Information About Your Visit        [employer] Information     [employer] gives you secure access to your electronic health record. If you see a primary care provider, you can also send messages to your care team and make appointments. If you have questions, please call your primary care clinic.  If you do not have a primary care provider, please call [phone] and they will assist you.        Care EveryWhere ID     This is your Care EveryWhere ID. This could be used by other organizations to access your [facility] medical records  [id number]        Your Vitals Were     Pulse Temperature Height Pulse Oximetry BMI (Body Mass Index)       77 97.8  F (36.6  C) (Oral) 1.575 m (5' 2\") 97% 30.64 kg/m2        Blood Pressure from Last 3 Encounters:   06/27/18 123/64   11/17/17 124/84   08/02/17 121/78    Weight from Last 3 Encounters:   06/27/18 76 kg (167 lb 8 oz)   11/17/17 78.6 kg (173 lb 3.2 oz)   08/02/17 78.9 kg (173 lb 14.4 oz)                 Today's Medication Changes          These changes are accurate as of 6/27/18  7:47 AM.  If you have any questions, ask your nurse or doctor.               These medicines have changed or have updated prescriptions.        Dose/Directions    secukinumab 150 MG/ML Sensoready pen   Commonly known as:  COSENTYX SENSOREADY PEN   This may have changed:  how much to take   Used for:  Psoriatic arthritis (H)   Changed by:  Joe Rodriguez MD        Dose:  300 mg   Inject 2 mLs (300 mg) Subcutaneous every 28 days Hold for signs of infection, and then seek medical attention.   Quantity:  6 mL   Refills:  2            Where to get your medicines      Call your pharmacy to confirm that your medication is ready for pickup. It may take up to 24 hours for them to receive the prescription. If the prescription is not ready within 3 business days, please contact your clinic or your provider.     We " will let you know when these medications are ready. If you don't hear back within 3 business days, please contact us.     secukinumab 150 MG/ML Sensoready pen                Primary Care Provider Office Phone # Fax #    MERCEDES Riley [phone]       [address] [address]        Equal Access to Services     [facility]: Hadii aad ku hadasho Soomaali, waaxda luqadaha, qaybta kaalmada adeegyada, waxay idiin hayaan adeeg kharash lafina . So [facility] [phone].    ATENCIÓN: Si habla español, tiene a duffy disposición servicios gratuitos de asistencia lingüística. Shaniqua al [phone].    We comply with applicable federal civil rights laws and [place] laws. We do not discriminate on the basis of race, color, national origin, age, disability, sex, sexual orientation, or gender identity.            Thank you!     Thank you for choosing [facility] R[facility]OLOG[facility]  for your care. Our goal is always to provide you with excellent care. Hearing back from our patients is one way we can continue to improve our services. Please take a few minutes to complete the written survey that you may receive in the mail after your visit with us. Thank you!             Your Updated Medication List - Protect others around you: Learn how to safely use, store and throw away your medicines at www.disposemymeds.org.          This list is accurate as of 6/27/18  7:47 AM.  Always use your most recent med list.                   Brand Name Dispense Instructions for use Diagnosis    acetaminophen 325 MG tablet    TYLENOL          atorvastatin 10 MG tablet    LIPITOR          ibuprofen 200 MG tablet    ADVIL/MOTRIN     Take by mouth as needed        IMITREX PO      Take 50 mg by mouth as needed for migraine        MULTIVITAMIN PO      Take 1 tablet by mouth daily        OMEPRAZOLE PO      Take 20 mg by mouth every morning        secukinumab 150 MG/ML Sensoready pen    COSENTYX SENSOREADY PEN    6 mL     Inject 2 mLs (300 mg) Subcutaneous every 28 days Hold for signs of infection, and then seek medical attention.    Psoriatic arthritis (H)       VITAMIN D (CHOLECALCIFEROL) PO      Take 15,000 Units by mouth once a week        ZYRTEC ALLERGY PO      Take 10 mg by mouth as needed

## 2018-06-27 NOTE — PATIENT INSTRUCTIONS
Dx: psoriatic arthritis, inadequately controlled.  Plan:  Increase cosentyx dose to 300 mg (2 injections) once monthly.

## 2018-06-27 NOTE — NURSING NOTE
Chief Complaint   Patient presents with     RECHECK     Psoriatic arthritis    Pt roomed, vitals, meds, and allergies reviewed with pt. Pt ready for provider.  Evan Wills, CMA

## 2018-09-17 ENCOUNTER — TRANSFERRED RECORDS (OUTPATIENT)
Dept: HEALTH INFORMATION MANAGEMENT | Facility: CLINIC | Age: 53
End: 2018-09-17

## 2018-09-30 ENCOUNTER — MYC MEDICAL ADVICE (OUTPATIENT)
Dept: RHEUMATOLOGY | Facility: CLINIC | Age: 53
End: 2018-09-30

## 2018-10-01 NOTE — TELEPHONE ENCOUNTER
I am sorry about the headaches. I recommend consultation with Dr. Amadeo Hughes or Dr. De Guzman in Orthopedics about the cervical nerve root compression.

## 2018-10-02 ENCOUNTER — MEDICAL CORRESPONDENCE (OUTPATIENT)
Dept: HEALTH INFORMATION MANAGEMENT | Facility: CLINIC | Age: 53
End: 2018-10-02

## 2018-10-12 ASSESSMENT — ENCOUNTER SYMPTOMS
SEIZURES: 0
JOINT SWELLING: 1
HEADACHES: 1
PALPITATIONS: 0
NAUSEA: 0
NUMBNESS: 0
NECK MASS: 0
SYNCOPE: 0
HOARSE VOICE: 1
BLOOD IN STOOL: 0
POOR WOUND HEALING: 1
SKIN CHANGES: 0
JAUNDICE: 0
NECK PAIN: 1
SINUS CONGESTION: 1
EYE WATERING: 0
BOWEL INCONTINENCE: 0
HEARTBURN: 1
EYE IRRITATION: 1
CONSTIPATION: 0
HYPOTENSION: 0
TROUBLE SWALLOWING: 0
BLOATING: 0
TREMORS: 0
BACK PAIN: 1
STIFFNESS: 1
LOSS OF CONSCIOUSNESS: 0
LEG PAIN: 1
MUSCLE CRAMPS: 0
ORTHOPNEA: 0
VOMITING: 0
DIZZINESS: 0
NAIL CHANGES: 0
MEMORY LOSS: 0
DISTURBANCES IN COORDINATION: 0
EYE PAIN: 1
LIGHT-HEADEDNESS: 0
EYE REDNESS: 0
HYPERTENSION: 0
RECTAL PAIN: 1
EXERCISE INTOLERANCE: 0
SINUS PAIN: 1
MYALGIAS: 0
SMELL DISTURBANCE: 0
PARALYSIS: 0
SPEECH CHANGE: 0
SORE THROAT: 0
WEAKNESS: 0
DIARRHEA: 0
TINGLING: 0
MUSCLE WEAKNESS: 0
ABDOMINAL PAIN: 1
ARTHRALGIAS: 1
SLEEP DISTURBANCES DUE TO BREATHING: 0
DOUBLE VISION: 0
TASTE DISTURBANCE: 0

## 2018-10-12 NOTE — TELEPHONE ENCOUNTER
FUTURE VISIT INFORMATION      FUTURE VISIT INFORMATION:    Date: 10/26/18    Time: 1420    Location: [facility]  REFERRAL INFORMATION:    Referring provider:  LINK POLANCO    Referring providers clinic:  [facility]    Reason for visit/diagnosis  CERVICAL STENOSIS     RECORDS REQUESTED FROM:       Clinic name Comments Records Status Imaging Status                                         RECORDS STATUS      RECORDS RECEIVED FROM: [facility]   DATE RECEIVED: 10/12/18   NOTES STATUS DETAILS   OFFICE NOTE from referring provider Received 9/17/18*   OFFICE NOTE from other specialist N/A    DISCHARGE SUMMARY from hospital N/A    DISCHARGE REPORT from the ER N/A    OPERATIVE REPORT N/A    MEDICATION LIST N/A    IMPLANT RECORD/STICKER N/A    LABS     CBC/DIFF N/A    CULTURES N/A    INJECTIONS DONE IN RADIOLOGY N/A    MRI Received 9/26/18*   CT SCAN N/A    XRAYS (IMAGES & REPORTS) N/A    TUMOR     PATHOLOGY  Slides & report N/A

## 2018-10-23 ENCOUNTER — PATIENT OUTREACH (OUTPATIENT)
Dept: CARE COORDINATION | Facility: CLINIC | Age: 53
End: 2018-10-23

## 2018-10-25 DIAGNOSIS — M54.2 NECK PAIN: Primary | ICD-10-CM
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
## 2018-10-26 ENCOUNTER — RADIANT APPOINTMENT (OUTPATIENT)
Dept: GENERAL RADIOLOGY | Facility: CLINIC | Age: 53
End: 2018-10-26
Attending: ORTHOPAEDIC SURGERY
Payer: COMMERCIAL

## 2018-10-26 ENCOUNTER — PRE VISIT (OUTPATIENT)
Dept: ORTHOPEDICS | Facility: CLINIC | Age: 53
End: 2018-10-26

## 2018-10-26 ENCOUNTER — OFFICE VISIT (OUTPATIENT)
Dept: ORTHOPEDICS | Facility: CLINIC | Age: 53
End: 2018-10-26
Payer: COMMERCIAL

## 2018-10-26 VITALS — HEIGHT: 62 IN | WEIGHT: 165 LBS | BODY MASS INDEX: 30.36 KG/M2

## 2018-10-26 DIAGNOSIS — M54.2 NECK PAIN: Primary | ICD-10-CM

## 2018-10-26 DIAGNOSIS — M54.2 NECK PAIN: ICD-10-CM

## 2018-10-26 LAB — RADIOLOGIST FLAGS: NORMAL

## 2018-10-26 NOTE — NURSING NOTE
"Reason For Visit:   Chief Complaint   Patient presents with     Consult     MRI 9/2018 at [facility] pain in neck and has been having migrains since she was 14        Primary MD: Aide Mccord  Ref. MD: Flex    ?  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  Work status?  Full time.  Date of injury: gotten worse in the last 6 months  Type of injury: Chronic .  Date of surgery: none   Type of surgery: None   Smoker: No  Request smoking cessation information: No    Ht 1.575 m (5' 2\")  Wt 74.8 kg (165 lb)  BMI 30.18 kg/m2    Pain Assessment  Patient Currently in Pain: Yes  0-10 Pain Scale: 6  Primary Pain Location: Neck  Pain Orientation: Upper  Pain Descriptors: Constant, Aching, Radiating  Alleviating Factors: NSAIDS, Rest, Ice  Aggravating Factors:  (driving and looking up )    Oswestry (MAITE) Questionnaire    OSWESTRY DISABILITY INDEX 10/12/2018   Count 10   Sum 3   Oswestry Score (%) 6   Some recent data might be hidden            Neck Disability Index (NDI) Questionnaire    No flowsheet data found.           Visual Analog Pain Scale  Neck Pain Scale 0-10: 7  Right arm pain: 2  Left arm pain: 2    Promis 10 Assessment    PROMIS 10 10/12/2018   In general, would you say your health is: Very good   In general, would you say your quality of life is: Very good   In general, how would you rate your physical health? Good   In general, how would you rate your mental health, including your mood and your ability to think? Very good   In general, how would you rate your satisfaction with your social activities and relationships? Very good   In general, please rate how well you carry out your usual social activities and roles Very good   To what extent are you able to carry out your everyday physical activities such as walking, climbing stairs, carrying groceries, or moving a chair? Mostly   How often have you been bothered by emotional problems such as feeling anxious, depressed or irritable? Rarely   How would " you rate your fatigue on average? Mild   How would you rate your pain on average?   0 = No Pain  to  10 = Worst Imaginable Pain 4   In general, would you say your health is: 4   In general, would you say your quality of life is: 4   In general, how would you rate your physical health? 3   In general, how would you rate your mental health, including your mood and your ability to think? 4   In general, how would you rate your satisfaction with your social activities and relationships? 4   In general, please rate how well you carry out your usual social activities and roles. (This includes activities at home, at work and in your community, and responsibilities as a parent, child, spouse, employee, friend, etc.) 4   To what extent are you able to carry out your everyday physical activities such as walking, climbing stairs, carrying groceries, or moving a chair? 4   In the past 7 days, how often have you been bothered by emotional problems such as feeling anxious, depressed, or irritable? 2   In the past 7 days, how would you rate your fatigue on average? 2   In the past 7 days, how would you rate your pain on average, where 0 means no pain, and 10 means worst imaginable pain? 4   Global Mental Health Score 16   Global Physical Health Score 14   PROMIS TOTAL - SUBSCORES 30   Some recent data might be hidden                Jose Hernandes ATC

## 2018-10-26 NOTE — MR AVS SNAPSHOT
After Visit Summary   10/26/2018    Veronica Sen    MRN: [id number]           Patient Information     Date Of Birth          1965        Visit Information        Provider Department      10/26/2018 2:20 PM Ja Hughes MD [facility]        Today's Diagnoses     Neck pain    -  1       Follow-ups after your visit        Additional Services     NEUROLOGY ADULT REFERRAL       Your provider has referred you for the following:   Consult at neurology migraine clinic    Please be aware that coverage of these services is subject to the terms and limitations of your health insurance plan.  Call member services at your health plan with any benefit or coverage questions.      Please bring the following with you to your appointment:    (1) Any X-Rays, CTs or MRIs which have been performed.  Contact the facility where they were done to arrange for  prior to your scheduled appointment.    (2) List of current medications  (3) This referral request   (4) Any documents/labs given to you for this referral            PHYSICAL THERAPY REFERRAL (External-Prints)       Physical Therapy Referral                  Your next 10 appointments already scheduled     Oct 31, 2018 10:00 AM CDT   (Arrive by 9:45 AM)   Return Visit with Joe Rodriguez MD   [facility] ([facility])    [address] [place] [address]  [address]  [facility] [address] [phone]              Future tests that were ordered for you today     Open Future Orders        Priority Expected Expires Ordered    PHYSICAL THERAPY REFERRAL (External-Prints) Routine  10/26/2019 10/26/2018            Who to contact     Please call your clinic at [phone] to:    Ask questions about your health    Make or cancel appointments    Discuss your medicines    Learn about your test results    Speak to your doctor            Additional Information About Your Visit        MyChart Information   "   [employer] gives you secure access to your electronic health record. If you see a primary care provider, you can also send messages to your care team and make appointments. If you have questions, please call your primary care clinic.  If you do not have a primary care provider, please call [phone] and they will assist you.      [employer] is an electronic gateway that provides easy, online access to your medical records. With [employer], you can request a clinic appointment, read your test results, renew a prescription or communicate with your care team.     To access your existing account, please contact your [facility] or call [phone] for assistance.        Care EveryWhere ID     This is your Care EveryWhere ID. This could be used by other organizations to access your [facility] medical records  [id number]        Your Vitals Were     Height BMI (Body Mass Index)                1.575 m (5' 2\") 30.18 kg/m2           Blood Pressure from Last 3 Encounters:   06/27/18 123/64   11/17/17 124/84   08/02/17 121/78    Weight from Last 3 Encounters:   10/26/18 74.8 kg (165 lb)   06/27/18 76 kg (167 lb 8 oz)   11/17/17 78.6 kg (173 lb 3.2 oz)              We Performed the Following     NEUROLOGY ADULT REFERRAL        Primary Care Provider Office Phone # Fax #    MERCEDES iRley [phone]       [place] [address]        Equal Access to Services     MARIE ADKINS AH: Hadii aad ku hadasho Sorenaeali, waaxda luqadaha, qaybta kaalmada adeegyada, gallo wells. So [facility] [phone].    ATENCIÓN: Si habla español, tiene a dfufy disposición servicios gratuitos de asistencia lingüística. Llame al [phone].    We comply with applicable federal civil rights laws and [place] laws. We do not discriminate on the basis of race, color, national origin, age, disability, sex, sexual orientation, or gender identity.            Thank " you!     Thank you for choosing [facility]  for your care. Our goal is always to provide you with excellent care. Hearing back from our patients is one way we can continue to improve our services. Please take a few minutes to complete the written survey that you may receive in the mail after your visit with us. Thank you!             Your Updated Medication List - Protect others around you: Learn how to safely use, store and throw away your medicines at www.disposemymeds.org.          This list is accurate as of 10/26/18  3:43 PM.  Always use your most recent med list.                   Brand Name Dispense Instructions for use Diagnosis    acetaminophen 325 MG tablet    TYLENOL          atorvastatin 10 MG tablet    LIPITOR          ibuprofen 200 MG tablet    ADVIL/MOTRIN     Take by mouth as needed        IMITREX PO      Take 50 mg by mouth as needed for migraine        MULTIVITAMIN PO      Take 1 tablet by mouth daily        OMEPRAZOLE PO      Take 20 mg by mouth every morning        secukinumab 150 MG/ML Sensoready pen    COSENTYX SENSOREADY PEN    6 mL    Inject 2 mLs (300 mg) Subcutaneous every 28 days Hold for signs of infection, and then seek medical attention.    Psoriatic arthritis (H)       VITAMIN D (CHOLECALCIFEROL) PO      Take 15,000 Units by mouth once a week        ZYRTEC ALLERGY PO      Take 10 mg by mouth as needed
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
## 2018-10-31 ENCOUNTER — OFFICE VISIT (OUTPATIENT)
Dept: RHEUMATOLOGY | Facility: CLINIC | Age: 53
End: 2018-10-31
Attending: INTERNAL MEDICINE
Payer: COMMERCIAL

## 2018-10-31 VITALS
DIASTOLIC BLOOD PRESSURE: 83 MMHG | HEIGHT: 62 IN | BODY MASS INDEX: 31.83 KG/M2 | WEIGHT: 173 LBS | SYSTOLIC BLOOD PRESSURE: 117 MMHG | HEART RATE: 78 BPM | TEMPERATURE: 97.9 F | OXYGEN SATURATION: 99 %

## 2018-10-31 DIAGNOSIS — L40.50 PSORIATIC ARTHRITIS (H): ICD-10-CM

## 2018-10-31 DIAGNOSIS — L40.50 PSORIATIC ARTHRITIS (H): Primary | ICD-10-CM

## 2018-10-31 LAB
ALT SERPL W P-5'-P-CCNC: 36 U/L (ref 0–50)
AST SERPL W P-5'-P-CCNC: 20 U/L (ref 0–45)
CREAT SERPL-MCNC: 0.69 MG/DL (ref 0.52–1.04)
ERYTHROCYTE [DISTWIDTH] IN BLOOD BY AUTOMATED COUNT: 12.2 % (ref 10–15)
GFR SERPL CREATININE-BSD FRML MDRD: 89 ML/MIN/1.7M2
HCT VFR BLD AUTO: 41.5 % (ref 35–47)
HGB BLD-MCNC: 13.6 G/DL (ref 11.7–15.7)
MCH RBC QN AUTO: 30.8 PG (ref 26.5–33)
MCHC RBC AUTO-ENTMCNC: 32.8 G/DL (ref 31.5–36.5)
MCV RBC AUTO: 94 FL (ref 78–100)
PLATELET # BLD AUTO: 227 10E9/L (ref 150–450)
RBC # BLD AUTO: 4.41 10E12/L (ref 3.8–5.2)
WBC # BLD AUTO: 6.5 10E9/L (ref 4–11)

## 2018-10-31 PROCEDURE — 82565 ASSAY OF CREATININE: CPT | Performed by: INTERNAL MEDICINE

## 2018-10-31 PROCEDURE — 84460 ALANINE AMINO (ALT) (SGPT): CPT | Performed by: INTERNAL MEDICINE

## 2018-10-31 PROCEDURE — 85027 COMPLETE CBC AUTOMATED: CPT | Performed by: INTERNAL MEDICINE

## 2018-10-31 PROCEDURE — 36415 COLL VENOUS BLD VENIPUNCTURE: CPT | Performed by: INTERNAL MEDICINE

## 2018-10-31 PROCEDURE — G0463 HOSPITAL OUTPT CLINIC VISIT: HCPCS | Mod: ZF

## 2018-10-31 PROCEDURE — 84450 TRANSFERASE (AST) (SGOT): CPT | Performed by: INTERNAL MEDICINE

## 2018-10-31 ASSESSMENT — PAIN SCALES - GENERAL: PAINLEVEL: MILD PAIN (3)

## 2018-10-31 NOTE — PROGRESS NOTES
[facility]  Rheumatology Clinic  Joe Rodriguez MD  10/31/2018     Name: Veronica Sen  MRN: [id number]  Age: 52 year old  : 1965  Referring provider: Aide Mccord     Assessment and Plan:  Psoriatic arthritis:  Patient reports continued moderate reduction in overall joint pain and reduced, improved nighttime hand and finger pain since starting Cosentyx in 2017. Benefit of Cosentyx must be balanced against increased migraines which increase in number with Cosentyx dosing. Exam shows no altered range of motion or synovitis in joints. On 2018, creatinine was 0.69, AST and ALT were normal, and CBC was normal. Plain films of the cervical spine showed 2- mm retrolisthesis at C4-C5, and mild moderate spondylosis at C6-C7.      Overall control of inflammatory seronegative arthritis is incomplete, despite four months' exposure to Cosentyx. I recommend now adding back low dose methotrexate to determine whether combination therapy will improve symptom control. Patient previously tried methotrexate monotherapy without benefit. But concurrent anticytokine therapy may reveal a therapeutic benefit for methotrexate.    Plan: start methotrexate 10 mg (4 tablets) weekly. While on methotrexate, restrict alcoholic drink intake to two drinks weekly; obtain liver function testing, CBC, and creatinine every 10-12 weeks.   Continue Cosentyx 300 mg (2 150 mg injections) monthly, subcutaneous.   Ibuprofen 400-600 TID PRN  Consult with PCC or dermatologist about persistent pruritic midback skin lesion; I doubt any causal relationship to psoriasis or Cosentyx.     - methotrexate 2.5 MG tablet CHEMO  Dispense: 24 tablet; Refill: 4  - ALT  - AST  - CBC with platelets  - Creatinine      Follow-up: 4 months    HPI:   Veronica Sen is a 52 year old female with a history of psoriatic arthritis who presents for follow up. She was last seen on 2018 at which time she was doing well. The plan was to continue  "Cosentyx 300 mg monthly subcutaneously weekly, with naproxen 440mg or prednisone bursts if needed.     She has been seen in 2013 by Dr. Madrigal, Rheum, who diagnosed \"erosive osteoarthritis\". TSH, ESR, KRYSTA, Lyme were all negative in 2014. Started Mtx 15 mg weekly in 9-15. Discontinued mtx in 2-16 d/t lack of efficacy. SSZ d/c'd in 3-16 d/t GI intolerance. Humira trial 2016: no efficacy. Otezla 1-17 start--d/c due to headaches. Started goliumab in 8-17, stopped due to no efficacy. Cosentyx started 10-17, increased from 150mg to 300mg in 6/18.     Today, she reports she is doing better. The 300 mg dose of Cosentyx is better for her than the 150 mg dose. Before taking the injections she has hand and ankle pain. She had an injection in one thumb joint and it bothers her less now. She notes a large reduction in her hand pain.     In the mornings her joint stiffness is not as bad as it was previously. It is now 10-15 minutes of stiffness typically before leaving. Her stiffness is related to her writing and activity with her hands.She has tenderness in the back of her neck. In the last three months the insides of her elbows have been tender to touch. She has back spasms when bending over. She doesn't notice a significant accentuation of foot pain after walking. Her foot pain is worst in the morning and after periods of rest.    She gets migraines a few days in a row after getting a shot, but then they go away. If she injects cocentrix  in each arm at the same time it wears off after 3 weeks. She has tried doing one shot in one arm every two weeks, and then the relief from pain lasts longer. However, when she does this she experiences a two clumps of the migraines each month instead of one, because she still has 2-3 days of migraines after each shot. Of note, She has had a slightly higher amount of gastroesophageal reflux disease after the shot    She has positional neck pain that goes away with looking down. An MRI at [facility] " showed that when she looks up she is pinching her spinal cord. She was not able to get an appointment with the two orthopedic referrals I previously tried to make.    She has tolerated methotrexate previously but it was not effective.    Of note, she reports skin rashes in the middle of her back, which is itchy. A biopsy found nothing abnormal.    Review of Systems:   Pertinent items are noted in HPI or as below, remainder of complete ROS is negative.      No recent problems with hearing or vision. No swallowing problems.   No breathing difficulty, shortness of breath, coughing, or wheezing  No chest pain or palpitations  No heart burn, indigestion, abdominal pain, nausea, vomiting, diarrhea  No urination problems, no bloody, cloudy urine, no dysuria  No numbing, tingling, weakness  No confusion  No easy bleeding or bruising.     Active Medications:     Current Outpatient Prescriptions:      acetaminophen (TYLENOL) 325 MG tablet, , Disp: , Rfl:      atorvastatin (LIPITOR) 10 MG tablet, , Disp: , Rfl:      Cetirizine HCl (ZYRTEC ALLERGY PO), Take 10 mg by mouth as needed, Disp: , Rfl:      ibuprofen (ADVIL/MOTRIN) 200 MG tablet, Take by mouth as needed, Disp: , Rfl:      Multiple Vitamins-Minerals (MULTIVITAMIN PO), Take 1 tablet by mouth daily, Disp: , Rfl:      OMEPRAZOLE PO, Take 20 mg by mouth every morning, Disp: , Rfl:      secukinumab (COSENTYX SENSOREADY PEN) 150 MG/ML Sensoready pen, Inject 2 mLs (300 mg) Subcutaneous every 28 days Hold for signs of infection, and then seek medical attention., Disp: 6 mL, Rfl: 2     SUMAtriptan Succinate (IMITREX PO), Take 50 mg by mouth as needed for migraine, Disp: , Rfl:      VITAMIN D, CHOLECALCIFEROL, PO, Take 15,000 Units by mouth once a week, Disp: , Rfl:       Allergies:   Hydrocodone  Morphine      Past Medical History:  Neck pain  Lateral epicondylitis   Migraine headache   GERD (gastroesophageal reflux disease)   Allergic rhinitis   Vision loss   Sinusitis   Back  "pain   Headache(784.0)   Allergy   Heartburn   Abdominal pain   Urinary tract infection   Menstrual cycle disorder   Pelvic pain   Varicella   Shingles   Transaminitis   Swelling, mass, or lump in head and neck   OA (osteoarthritis)   Diverticula of colon   Intestinal diverticular abscess   Motion sickness   Finger pain   Psoriatic arthritis (HCC)     Past Surgical History:    Endometrial ablation  Sinus surgery  Partial coloctomy  meniscectomy    Family History:   No smoking  Unknown smokeless tobacco or alcohol use  ; lives with  and children     Social History:   Denies smoking.    Physical Exam:   /83  Pulse 78  Temp 97.9  F (36.6  C) (Oral)  Ht 1.575 m (5' 2\")  Wt 78.5 kg (173 lb)  SpO2 99%  BMI 31.64 kg/m2   Wt Readings from Last 4 Encounters:   10/31/18 78.5 kg (173 lb)   10/26/18 74.8 kg (165 lb)   18 76 kg (167 lb 8 oz)   17 78.6 kg (173 lb 3.2 oz)     Constitutional: Well-developed, appearing stated age; cooperative  Eyes: Normal EOM, PERRLA, vision, conjunctiva, sclera  ENT: Normal external ears, nose, hearing, lips, teeth, gums, throat. No mucous membrane lesions, normal saliva pool  Neck: No mass or thyroid enlargement  Resp: Lungs clear to auscultation, nl to palpation  CV: RRR, no murmurs, rubs or gallops, no edema  GI: No ABD mass or tenderness, no HSM  : Not tested  Lymph: No cervical, supraclavicular, inguinal or epitrochlear nodes  MS: The TMJ, neck, shoulder, elbow, wrist, spine, hip, knee, ankle, and foot MTP/IP joints were examined and found normal. No active synovitis or altered joint anatomy. Full joint ROM. Normal  strength. No dactylitis,  tenosynovitis, enthesopathy. Good alignment of MCP PIP with no swelling at MCPs. Tenderness to palpation at left first CMC and pain with circumduction of the thumb. Wrists show full flex andexte as do the elbows.   Skin: No nail pitting, alopecia, nodules or lesions. There is an area 3 mm across of " violaceous skin on her back. It doesn't look like psoriasis or a typical mole.  Neuro: Normal cranial nerves, strength, sensation, DTRs.   Psych: Normal judgement, orientation, memory, affect.       Laboratory:   Results for orders placed or performed in visit on 10/31/18   Creatinine   Result Value Ref Range    Creatinine 0.69 0.52 - 1.04 mg/dL    GFR Estimate 89 >60 mL/min/1.7m2    GFR Estimate If Black >90 >60 mL/min/1.7m2       Recent Labs   Lab Test  10/31/18   1116  06/27/18   0807  11/17/17   0853  11/30/16   1335  07/13/16   1209  12/02/15   1149   WBC  6.5  5.9  6.5  5.5  5.8  8.2   RBC  4.41  4.64  4.45  4.37  4.34  4.22   HGB  13.6  14.1  13.8  14.0  13.6  13.3   HCT  41.5  43.2  42.2  40.5  40.4  39.7   MCV  94  93  95  92.7  93  94   RDW  12.2  12.0  12.4  9.8*  12.0  13.7   PLT  227  212  216  258  211  283   ALBUMIN   --    --    --   4.5  4.3   --    CRP   --   <2.9  <2.9   --    --   7.1      No results for input(s): TSH, T4 in the last 88476 hours.  Hemoglobin   Date Value Ref Range Status   10/31/2018 13.6 11.7 - 15.7 g/dL Final   06/27/2018 14.1 11.7 - 15.7 g/dL Final   11/17/2017 13.8 11.7 - 15.7 g/dL Final     CRP Inflammation   Date Value Ref Range Status   06/27/2018 <2.9 0.0 - 8.0 mg/L Final   11/17/2017 <2.9 0.0 - 8.0 mg/L Final   12/02/2015 7.1 0.0 - 8.0 mg/L Final     AST   Date Value Ref Range Status   10/31/2018 20 0 - 45 U/L Final   06/27/2018 22 0 - 45 U/L Final   11/17/2017 33 0 - 45 U/L Final     Albumin   Date Value Ref Range Status   11/30/2016 4.5 3.5 - 5.0 g/dL Final   07/13/2016 4.3 3.4 - 5.0 g/dL Final     ALT   Date Value Ref Range Status   10/31/2018 36 0 - 50 U/L Final   06/27/2018 32 0 - 50 U/L Final   11/17/2017 40 0 - 50 U/L Final     Recent Labs   Lab Test  10/31/18   1116  06/27/18   0807  11/17/17   0853   WBC  6.5  5.9  6.5   HGB  13.6  14.1  13.8   HCT  41.5  43.2  42.2   MCV  94  93  95   PLT  227  212  216   AST  20  22  33   ALT  36  32  40       Reviewed  Rheumatology lab flowsheet    Scribe Disclosure:   I, Wilmar Dorman, am serving as a scribe to document services personally performed by Joe Rodriguez MD at this visit, based upon the provider's statements to me. All documentation has been reviewed by the aforementioned provider prior to being entered into the official medical record.     Portions of this medical record were completed by a scribe. UPON MY REVIEW AND AUTHENTICATION BY ELECTRONIC SIGNATURE, this confirms (a) I performed the applicable clinical services, and (b) the record is accurate.  Joe Rodriguez MD  Staff RheumatologistBRYSON

## 2018-10-31 NOTE — LETTER
10/31/2018      RE: Veronica Sen  [address] [place] [id number]       [facility]  Rheumatology Clinic  Joe Rodriguez MD  10/31/2018     Name: Veronica Sen  MRN: [id number]  Age: 52 year old  : 1965  Referring provider: Aide Mccord     Assessment and Plan:  Psoriatic arthritis:  Patient reports continued moderate reduction in overall joint pain and reduced, improved nighttime hand and finger pain since starting Cosentyx in 2017. Benefit of Cosentyx must be balanced against increased migraines which increase in number with Cosentyx dosing. Exam shows no altered range of motion or synovitis in joints. On 2018, creatinine was 0.69, AST and ALT were normal, and CBC was normal. Plain films of the cervical spine showed 2- mm retrolisthesis at C4-C5, and mild moderate spondylosis at C6-C7.      Overall control of inflammatory seronegative arthritis is incomplete, despite four months' exposure to Cosentyx. I recommend now adding back low dose methotrexate to determine whether combination therapy will improve symptom control. Patient previously tried methotrexate monotherapy without benefit. But concurrent anticytokine therapy may reveal a therapeutic benefit for methotrexate.    Plan: start methotrexate 10 mg (4 tablets) weekly. While on methotrexate, restrict alcoholic drink intake to two drinks weekly; obtain liver function testing, CBC, and creatinine every 10-12 weeks.   Continue Cosentyx 300 mg (2 150 mg injections) monthly, subcutaneous.   Ibuprofen 400-600 TID PRN  Consult with PCC or dermatologist about persistent pruritic midback skin lesion; I doubt any causal relationship to psoriasis or Cosentyx.     - methotrexate 2.5 MG tablet CHEMO  Dispense: 24 tablet; Refill: 4  - ALT  - AST  - CBC with platelets  - Creatinine      Follow-up: 4 months    HPI:   Veronica Sen is a 52 year old female with a history of psoriatic arthritis who presents for follow up. She was  "last seen on 6/27/2018 at which time she was doing well. The plan was to continue Cosentyx 300 mg monthly subcutaneously weekly, with naproxen 440mg or prednisone bursts if needed.     She has been seen in 2013 by Dr. Madrigal, Rheum, who diagnosed \"erosive osteoarthritis\". TSH, ESR, KRYSTA, Lyme were all negative in 2014. Started Mtx 15 mg weekly in 9-15. Discontinued mtx in 2-16 d/t lack of efficacy. SSZ d/c'd in 3-16 d/t GI intolerance. Humira trial 2016: no efficacy. Otezla 1-17 start--d/c due to headaches. Started goliumab in 8-17, stopped due to no efficacy. Cosentyx started 10-17, increased from 150mg to 300mg in 6/18.     Today, she reports she is doing better. The 300 mg dose of Cosentyx is better for her than the 150 mg dose. Before taking the injections she has hand and ankle pain. She had an injection in one thumb joint and it bothers her less now. She notes a large reduction in her hand pain.     In the mornings her joint stiffness is not as bad as it was previously. It is now 10-15 minutes of stiffness typically before leaving. Her stiffness is related to her writing and activity with her hands.She has tenderness in the back of her neck. In the last three months the insides of her elbows have been tender to touch. She has back spasms when bending over. She doesn't notice a significant accentuation of foot pain after walking. Her foot pain is worst in the morning and after periods of rest.    She gets migraines a few days in a row after getting a shot, but then they go away. If she injects cocentrix  in each arm at the same time it wears off after 3 weeks. She has tried doing one shot in one arm every two weeks, and then the relief from pain lasts longer. However, when she does this she experiences a two clumps of the migraines each month instead of one, because she still has 2-3 days of migraines after each shot. Of note, She has had a slightly higher amount of gastroesophageal reflux disease after the " shot    She has positional neck pain that goes away with looking down. An MRI at [facility] showed that when she looks up she is pinching her spinal cord. She was not able to get an appointment with the two orthopedic referrals I previously tried to make.    She has tolerated methotrexate previously but it was not effective.    Of note, she reports skin rashes in the middle of her back, which is itchy. A biopsy found nothing abnormal.    Review of Systems:   Pertinent items are noted in HPI or as below, remainder of complete ROS is negative.      No recent problems with hearing or vision. No swallowing problems.   No breathing difficulty, shortness of breath, coughing, or wheezing  No chest pain or palpitations  No heart burn, indigestion, abdominal pain, nausea, vomiting, diarrhea  No urination problems, no bloody, cloudy urine, no dysuria  No numbing, tingling, weakness  No confusion  No easy bleeding or bruising.     Active Medications:     Current Outpatient Prescriptions:      acetaminophen (TYLENOL) 325 MG tablet, , Disp: , Rfl:      atorvastatin (LIPITOR) 10 MG tablet, , Disp: , Rfl:      Cetirizine HCl (ZYRTEC ALLERGY PO), Take 10 mg by mouth as needed, Disp: , Rfl:      ibuprofen (ADVIL/MOTRIN) 200 MG tablet, Take by mouth as needed, Disp: , Rfl:      Multiple Vitamins-Minerals (MULTIVITAMIN PO), Take 1 tablet by mouth daily, Disp: , Rfl:      OMEPRAZOLE PO, Take 20 mg by mouth every morning, Disp: , Rfl:      secukinumab (COSENTYX SENSOREADY PEN) 150 MG/ML Sensoready pen, Inject 2 mLs (300 mg) Subcutaneous every 28 days Hold for signs of infection, and then seek medical attention., Disp: 6 mL, Rfl: 2     SUMAtriptan Succinate (IMITREX PO), Take 50 mg by mouth as needed for migraine, Disp: , Rfl:      VITAMIN D, CHOLECALCIFEROL, PO, Take 15,000 Units by mouth once a week, Disp: , Rfl:       Allergies:   Hydrocodone  Morphine      Past Medical History:  Neck pain  Lateral epicondylitis   Migraine headache   GERD  "(gastroesophageal reflux disease)   Allergic rhinitis   Vision loss   Sinusitis   Back pain   Headache(784.0)   Allergy   Heartburn   Abdominal pain   Urinary tract infection   Menstrual cycle disorder   Pelvic pain   Varicella   Shingles   Transaminitis   Swelling, mass, or lump in head and neck   OA (osteoarthritis)   Diverticula of colon   Intestinal diverticular abscess   Motion sickness   Finger pain   Psoriatic arthritis (HCC)     Past Surgical History:    Endometrial ablation  Sinus surgery  Partial coloctomy  meniscectomy    Family History:   No smoking  Unknown smokeless tobacco or alcohol use  ; lives with  and children     Social History:   Denies smoking.    Physical Exam:   /83  Pulse 78  Temp 97.9  F (36.6  C) (Oral)  Ht 1.575 m (5' 2\")  Wt 78.5 kg (173 lb)  SpO2 99%  BMI 31.64 kg/m2   Wt Readings from Last 4 Encounters:   10/31/18 78.5 kg (173 lb)   10/26/18 74.8 kg (165 lb)   18 76 kg (167 lb 8 oz)   17 78.6 kg (173 lb 3.2 oz)     Constitutional: Well-developed, appearing stated age; cooperative  Eyes: Normal EOM, PERRLA, vision, conjunctiva, sclera  ENT: Normal external ears, nose, hearing, lips, teeth, gums, throat. No mucous membrane lesions, normal saliva pool  Neck: No mass or thyroid enlargement  Resp: Lungs clear to auscultation, nl to palpation  CV: RRR, no murmurs, rubs or gallops, no edema  GI: No ABD mass or tenderness, no HSM  : Not tested  Lymph: No cervical, supraclavicular, inguinal or epitrochlear nodes  MS: The TMJ, neck, shoulder, elbow, wrist, spine, hip, knee, ankle, and foot MTP/IP joints were examined and found normal. No active synovitis or altered joint anatomy. Full joint ROM. Normal  strength. No dactylitis,  tenosynovitis, enthesopathy. Good alignment of MCP PIP with no swelling at MCPs. Tenderness to palpation at left first CMC and pain with circumduction of the thumb. Wrists show full flex andexte as do the elbows. "   Skin: No nail pitting, alopecia, nodules or lesions. There is an area 3 mm across of violaceous skin on her back. It doesn't look like psoriasis or a typical mole.  Neuro: Normal cranial nerves, strength, sensation, DTRs.   Psych: Normal judgement, orientation, memory, affect.       Laboratory:   Results for orders placed or performed in visit on 10/31/18   Creatinine   Result Value Ref Range    Creatinine 0.69 0.52 - 1.04 mg/dL    GFR Estimate 89 >60 mL/min/1.7m2    GFR Estimate If Black >90 >60 mL/min/1.7m2       Recent Labs   Lab Test  10/31/18   1116  06/27/18   0807  11/17/17   0853  11/30/16   1335  07/13/16   1209  12/02/15   1149   WBC  6.5  5.9  6.5  5.5  5.8  8.2   RBC  4.41  4.64  4.45  4.37  4.34  4.22   HGB  13.6  14.1  13.8  14.0  13.6  13.3   HCT  41.5  43.2  42.2  40.5  40.4  39.7   MCV  94  93  95  92.7  93  94   RDW  12.2  12.0  12.4  9.8*  12.0  13.7   PLT  227  212  216  258  211  283   ALBUMIN   --    --    --   4.5  4.3   --    CRP   --   <2.9  <2.9   --    --   7.1      No results for input(s): TSH, T4 in the last 28332 hours.  Hemoglobin   Date Value Ref Range Status   10/31/2018 13.6 11.7 - 15.7 g/dL Final   06/27/2018 14.1 11.7 - 15.7 g/dL Final   11/17/2017 13.8 11.7 - 15.7 g/dL Final     CRP Inflammation   Date Value Ref Range Status   06/27/2018 <2.9 0.0 - 8.0 mg/L Final   11/17/2017 <2.9 0.0 - 8.0 mg/L Final   12/02/2015 7.1 0.0 - 8.0 mg/L Final     AST   Date Value Ref Range Status   10/31/2018 20 0 - 45 U/L Final   06/27/2018 22 0 - 45 U/L Final   11/17/2017 33 0 - 45 U/L Final     Albumin   Date Value Ref Range Status   11/30/2016 4.5 3.5 - 5.0 g/dL Final   07/13/2016 4.3 3.4 - 5.0 g/dL Final     ALT   Date Value Ref Range Status   10/31/2018 36 0 - 50 U/L Final   06/27/2018 32 0 - 50 U/L Final   11/17/2017 40 0 - 50 U/L Final     Recent Labs   Lab Test  10/31/18   1116  06/27/18   0807  11/17/17   0853   WBC  6.5  5.9  6.5   HGB  13.6  14.1  13.8   HCT  41.5  43.2  42.2   MCV  94   93  95   PLT  227  212  216   AST  20  22  33   ALT  36  32  40       Reviewed Rheumatology lab flowsheet    Scribe Disclosure:   I, Wilmar Dorman, am serving as a scribe to document services personally performed by Joe Rodriguez MD at this visit, based upon the provider's statements to me. All documentation has been reviewed by the aforementioned provider prior to being entered into the official medical record.     Portions of this medical record were completed by a scribe. UPON MY REVIEW AND AUTHENTICATION BY ELECTRONIC SIGNATURE, this confirms (a) I performed the applicable clinical services, and (b) the record is accurate.  Joe Rodriguez MD  Staff Rheumatologist, [facility]

## 2018-10-31 NOTE — MR AVS SNAPSHOT
After Visit Summary   10/31/2018    Veronica Sen    MRN: [id number]           Patient Information     Date Of Birth          1965        Visit Information        Provider Department      10/31/2018 10:00 AM Joe Rodriguez MD [facility] Rheumatology        Today's Diagnoses     Psoriatic arthritis (H)    -  1       Follow-ups after your visit        Follow-up notes from your care team     Return in about 4 months (around 2/28/2019).      Your next 10 appointments already scheduled     Oct 31, 2018 11:15 AM CDT   Lab with  LAB   [facility] ([facility])    [address]  1st Floor  [place] [id number] [phone]            Apr 03, 2019  9:30 AM CDT   (Arrive by 9:15 AM)   Return Visit with Joe Rodriguez MD   [facility] Rheumatology ([facility])    [address]  [address]  [place] [address] [phone]              Future tests that were ordered for you today     Open Future Orders        Priority Expected Expires Ordered    ALT Routine  5/2/2019 10/31/2018    AST Routine  5/2/2019 10/31/2018    CBC with platelets Routine  5/2/2019 10/31/2018            Who to contact     If you have questions or need follow up information about today's clinic visit or your schedule please contact [facility] RHEUMATOLOGY directly at [phone].  Normal or non-critical lab and imaging results will be communicated to you by MyChart, letter or phone within 4 business days after the clinic has received the results. If you do not hear from us within 7 days, please contact the clinic through MyChart or phone. If you have a critical or abnormal lab result, we will notify you by phone as soon as possible.  Submit refill requests through [employer] or call your pharmacy and they will forward the refill request to us. Please allow 3 business days for your refill to be completed.          Additional Information About Your Visit    "     MyChart Information     [facility] gives you secure access to your electronic health record. If you see a primary care provider, you can also send messages to your care team and make appointments. If you have questions, please call your primary care clinic.  If you do not have a primary care provider, please call [phone] and they will assist you.        Care EveryWhere ID     This is your Care EveryWhere ID. This could be used by other organizations to access your [place] medical records  [id number]        Your Vitals Were     Pulse Temperature Height Pulse Oximetry BMI (Body Mass Index)       78 97.9  F (36.6  C) (Oral) 1.575 m (5' 2\") 99% 31.64 kg/m2        Blood Pressure from Last 3 Encounters:   10/31/18 117/83   06/27/18 123/64   11/17/17 124/84    Weight from Last 3 Encounters:   10/31/18 78.5 kg (173 lb)   10/26/18 74.8 kg (165 lb)   06/27/18 76 kg (167 lb 8 oz)              We Performed the Following     Creatinine          Today's Medication Changes          These changes are accurate as of 10/31/18 11:00 AM.  If you have any questions, ask your nurse or doctor.               Start taking these medicines.        Dose/Directions    methotrexate 2.5 MG tablet CHEMO   Used for:  Psoriatic arthritis (H)   Started by:  Joe Rodriguez MD        Take 4 tablets by mouth every 7 days.   Quantity:  24 tablet   Refills:  4            Where to get your medicines      These medications were sent to [facility]/pharmacy #[id number] - [place], [place] - [address]  [address] [address]     Phone:  [phone]     methotrexate 2.5 MG tablet CHEMO                Primary Care Provider Office Phone # Fax #    MERCEDES Riley [phone]       [facility] [address]  [place] [address]        Equal Access to Services     MARIE ADKINS AH: Ksenia Crockett, waaxda luqadaha, qaybta kaalmada armindayajay, gallo wells. So wa " [phone].    ATENCIÓN: Si anne kennedy, tiene a duffy disposición servicios gratuitos de asistencia lingüística. Shaniqua cordova [phone].    We comply with applicable federal civil rights laws and [place] laws. We do not discriminate on the basis of race, color, national origin, age, disability, sex, sexual orientation, or gender identity.            Thank you!     Thank you for choosing [facility] RHEUMATOLOGY  for your care. Our goal is always to provide you with excellent care. Hearing back from our patients is one way we can continue to improve our services. Please take a few minutes to complete the written survey that you may receive in the mail after your visit with us. Thank you!             Your Updated Medication List - Protect others around you: Learn how to safely use, store and throw away your medicines at www.disposemymeds.org.          This list is accurate as of 10/31/18 11:00 AM.  Always use your most recent med list.                   Brand Name Dispense Instructions for use Diagnosis    acetaminophen 325 MG tablet    TYLENOL          atorvastatin 10 MG tablet    LIPITOR          ibuprofen 200 MG tablet    ADVIL/MOTRIN     Take by mouth as needed        IMITREX PO      Take 50 mg by mouth as needed for migraine        methotrexate 2.5 MG tablet CHEMO     24 tablet    Take 4 tablets by mouth every 7 days.    Psoriatic arthritis (H)       OMEPRAZOLE PO      Take 20 mg by mouth every morning        secukinumab 150 MG/ML Sensoready pen    COSENTYX SENSOREADY PEN    6 mL    Inject 2 mLs (300 mg) Subcutaneous every 28 days Hold for signs of infection, and then seek medical attention.    Psoriatic arthritis (H)       VITAMIN D (CHOLECALCIFEROL) PO      Take 15,000 Units by mouth once a week        ZYRTEC ALLERGY PO      Take 10 mg by mouth as needed

## 2018-10-31 NOTE — LETTER
10/31/2018       RE: Veronica Sen  [address] [address] [address]     Dear Colleague,    Thank you for referring your patient, Veronica Sen, to the [facility]. Please see a copy of my visit note below.    [facility]  Rheumatology Clinic  Joe Rodriguez MD  10/31/2018     Name: Veronica Sen  MRN: [id number]  Age: 52 year old  : 1965  Referring provider: Aide Mccord     Assessment and Plan:  Psoriatic arthritis:  Patient reports continued moderate reduction in overall joint pain and reduced, improved nighttime hand and finger pain since starting Cosentyx in 2017. Benefit of Cosentyx must be balanced against increased migraines which increase in number with Cosentyx dosing. Exam shows no altered range of motion or synovitis in joints. On 2018, creatinine was 0.69, AST and ALT were normal, and CBC was normal. Plain films of the cervical spine showed 2- mm retrolisthesis at C4-C5, and mild moderate spondylosis at C6-C7.      Overall control of inflammatory seronegative arthritis is incomplete, despite four months' exposure to Cosentyx. I recommend now adding back low dose methotrexate to determine whether combination therapy will improve symptom control. Patient previously tried methotrexate monotherapy without benefit. But concurrent anticytokine therapy may reveal a therapeutic benefit for methotrexate.    Plan: start methotrexate 10 mg (4 tablets) weekly. While on methotrexate, restrict alcoholic drink intake to two drinks weekly; obtain liver function testing, CBC, and creatinine every 10-12 weeks.   Continue Cosentyx 300 mg (2 150 mg injections) monthly, subcutaneous.   Ibuprofen 400-600 TID PRN  Consult with PCC or dermatologist about persistent pruritic midback skin lesion; I doubt any causal relationship to psoriasis or Cosentyx.     - methotrexate 2.5 MG tablet CHEMO  Dispense: 24 tablet; Refill: 4  -  "ALT  - AST  - CBC with platelets  - Creatinine      Follow-up: 4 months    HPI:   Veronica Sen is a 52 year old female with a history of psoriatic arthritis who presents for follow up. She was last seen on 6/27/2018 at which time she was doing well. The plan was to continue Cosentyx 300 mg monthly subcutaneously weekly, with naproxen 440mg or prednisone bursts if needed.     She has been seen in 2013 by Dr. Madrigal, Rheum, who diagnosed \"erosive osteoarthritis\". TSH, ESR, KRYSTA, Lyme were all negative in 2014. Started Mtx 15 mg weekly in 9-15. Discontinued mtx in 2-16 d/t lack of efficacy. SSZ d/c'd in 3-16 d/t GI intolerance. Humira trial 2016: no efficacy. Otezla 1-17 start--d/c due to headaches. Started goliumab in 8-17, stopped due to no efficacy. Cosentyx started 10-17, increased from 150mg to 300mg in 6/18.     Today, she reports she is doing better. The 300 mg dose of Cosentyx is better for her than the 150 mg dose. Before taking the injections she has hand and ankle pain. She had an injection in one thumb joint and it bothers her less now. She notes a large reduction in her hand pain.     In the mornings her joint stiffness is not as bad as it was previously. It is now 10-15 minutes of stiffness typically before leaving. Her stiffness is related to her writing and activity with her hands.She has tenderness in the back of her neck. In the last three months the insides of her elbows have been tender to touch. She has back spasms when bending over. She doesn't notice a significant accentuation of foot pain after walking. Her foot pain is worst in the morning and after periods of rest.    She gets migraines a few days in a row after getting a shot, but then they go away. If she injects cocentrix  in each arm at the same time it wears off after 3 weeks. She has tried doing one shot in one arm every two weeks, and then the relief from pain lasts longer. However, when she does this she experiences a two " clumps of the migraines each month instead of one, because she still has 2-3 days of migraines after each shot. Of note, She has had a slightly higher amount of gastroesophageal reflux disease after the shot    She has positional neck pain that goes away with looking down. An [facility] at [facility] showed that when she looks up she is pinching her spinal cord. She was not able to get an appointment with the two orthopedic referrals I previously tried to make.    She has tolerated methotrexate previously but it was not effective.    Of note, she reports skin rashes in the middle of her back, which is itchy. A biopsy found nothing abnormal.    Review of Systems:   Pertinent items are noted in HPI or as below, remainder of complete ROS is negative.      No recent problems with hearing or vision. No swallowing problems.   No breathing difficulty, shortness of breath, coughing, or wheezing  No chest pain or palpitations  No heart burn, indigestion, abdominal pain, nausea, vomiting, diarrhea  No urination problems, no bloody, cloudy urine, no dysuria  No numbing, tingling, weakness  No confusion  No easy bleeding or bruising.     Active Medications:     Current Outpatient Prescriptions:      acetaminophen (TYLENOL) 325 MG tablet, , Disp: , Rfl:      atorvastatin (LIPITOR) 10 MG tablet, , Disp: , Rfl:      Cetirizine HCl (ZYRTEC ALLERGY PO), Take 10 mg by mouth as needed, Disp: , Rfl:      ibuprofen (ADVIL/MOTRIN) 200 MG tablet, Take by mouth as needed, Disp: , Rfl:      Multiple Vitamins-Minerals (MULTIVITAMIN PO), Take 1 tablet by mouth daily, Disp: , Rfl:      OMEPRAZOLE PO, Take 20 mg by mouth every morning, Disp: , Rfl:      secukinumab (COSENTYX SENSOREADY PEN) 150 MG/ML Sensoready pen, Inject 2 mLs (300 mg) Subcutaneous every 28 days Hold for signs of infection, and then seek medical attention., Disp: 6 mL, Rfl: 2     SUMAtriptan Succinate (IMITREX PO), Take 50 mg by mouth as needed for migraine, Disp: , Rfl:      VITAMIN D,  "CHOLECALCIFEROL, PO, Take 15,000 Units by mouth once a week, Disp: , Rfl:       Allergies:   Hydrocodone  Morphine      Past Medical History:  Neck pain  Lateral epicondylitis   Migraine headache   GERD (gastroesophageal reflux disease)   Allergic rhinitis   Vision loss   Sinusitis   Back pain   Headache(784.0)   Allergy   Heartburn   Abdominal pain   Urinary tract infection   Menstrual cycle disorder   Pelvic pain   Varicella   Shingles   Transaminitis   Swelling, mass, or lump in head and neck   OA (osteoarthritis)   Diverticula of colon   Intestinal diverticular abscess   Motion sickness   Finger pain   Psoriatic arthritis (HCC)     Past Surgical History:    Endometrial ablation  Sinus surgery  Partial coloctomy  meniscectomy    Family History:   No smoking  Unknown smokeless tobacco or alcohol use  ; lives with  and children     Social History:   Denies smoking.    Physical Exam:   /83  Pulse 78  Temp 97.9  F (36.6  C) (Oral)  Ht 1.575 m (5' 2\")  Wt 78.5 kg (173 lb)  SpO2 99%  BMI 31.64 kg/m2   Wt Readings from Last 4 Encounters:   10/31/18 78.5 kg (173 lb)   10/26/18 74.8 kg (165 lb)   18 76 kg (167 lb 8 oz)   17 78.6 kg (173 lb 3.2 oz)     Constitutional: Well-developed, appearing stated age; cooperative  Eyes: Normal EOM, PERRLA, vision, conjunctiva, sclera  ENT: Normal external ears, nose, hearing, lips, teeth, gums, throat. No mucous membrane lesions, normal saliva pool  Neck: No mass or thyroid enlargement  Resp: Lungs clear to auscultation, nl to palpation  CV: RRR, no murmurs, rubs or gallops, no edema  GI: No ABD mass or tenderness, no HSM  : Not tested  Lymph: No cervical, supraclavicular, inguinal or epitrochlear nodes  MS: The TMJ, neck, shoulder, elbow, wrist, spine, hip, knee, ankle, and foot MTP/IP joints were examined and found normal. No active synovitis or altered joint anatomy. Full joint ROM. Normal  strength. No dactylitis,  tenosynovitis, " enthesopathy. Good alignment of MCP PIP with no swelling at MCPs. Tenderness to palpation at left first CMC and pain with circumduction of the thumb. Wrists show full flex andexte as do the elbows.   Skin: No nail pitting, alopecia, nodules or lesions. There is an area 3 mm across of violaceous skin on her back. It doesn't look like psoriasis or a typical mole.  Neuro: Normal cranial nerves, strength, sensation, DTRs.   Psych: Normal judgement, orientation, memory, affect.       Laboratory:   Results for orders placed or performed in visit on 10/31/18   Creatinine   Result Value Ref Range    Creatinine 0.69 0.52 - 1.04 mg/dL    GFR Estimate 89 >60 mL/min/1.7m2    GFR Estimate If Black >90 >60 mL/min/1.7m2       Recent Labs   Lab Test  10/31/18   1116  06/27/18   0807  11/17/17   0853  11/30/16   1335  07/13/16   1209  12/02/15   1149   WBC  6.5  5.9  6.5  5.5  5.8  8.2   RBC  4.41  4.64  4.45  4.37  4.34  4.22   HGB  13.6  14.1  13.8  14.0  13.6  13.3   HCT  41.5  43.2  42.2  40.5  40.4  39.7   MCV  94  93  95  92.7  93  94   RDW  12.2  12.0  12.4  9.8*  12.0  13.7   PLT  227  212  216  258  211  283   ALBUMIN   --    --    --   4.5  4.3   --    CRP   --   <2.9  <2.9   --    --   7.1      No results for input(s): TSH, T4 in the last 56958 hours.  Hemoglobin   Date Value Ref Range Status   10/31/2018 13.6 11.7 - 15.7 g/dL Final   06/27/2018 14.1 11.7 - 15.7 g/dL Final   11/17/2017 13.8 11.7 - 15.7 g/dL Final     CRP Inflammation   Date Value Ref Range Status   06/27/2018 <2.9 0.0 - 8.0 mg/L Final   11/17/2017 <2.9 0.0 - 8.0 mg/L Final   12/02/2015 7.1 0.0 - 8.0 mg/L Final     AST   Date Value Ref Range Status   10/31/2018 20 0 - 45 U/L Final   06/27/2018 22 0 - 45 U/L Final   11/17/2017 33 0 - 45 U/L Final     Albumin   Date Value Ref Range Status   11/30/2016 4.5 3.5 - 5.0 g/dL Final   07/13/2016 4.3 3.4 - 5.0 g/dL Final     ALT   Date Value Ref Range Status   10/31/2018 36 0 - 50 U/L Final   06/27/2018 32 0 - 50  U/L Final   11/17/2017 40 0 - 50 U/L Final     Recent Labs   Lab Test  10/31/18   1116  06/27/18   0807  11/17/17   0853   WBC  6.5  5.9  6.5   HGB  13.6  14.1  13.8   HCT  41.5  43.2  42.2   MCV  94  93  95   PLT  227  212  216   AST  20  22  33   ALT  36  32  40       Reviewed Rheumatology lab flowsheet    Scribe Disclosure:   I, Wilmar Dorman, am serving as a scribe to document services personally performed by Joe Rodriguez MD at this visit, based upon the provider's statements to me. All documentation has been reviewed by the aforementioned provider prior to being entered into the official medical record.     Portions of this medical record were completed by a scribe. UPON MY REVIEW AND AUTHENTICATION BY ELECTRONIC SIGNATURE, this confirms (a) I performed the applicable clinical services, and (b) the record is accurate.  Joe Rodriguez MD  Staff Rheumatologist, [facility]

## 2019-01-23 ENCOUNTER — TELEPHONE (OUTPATIENT)
Dept: RHEUMATOLOGY | Facility: CLINIC | Age: 54
End: 2019-01-23

## 2019-01-23 NOTE — TELEPHONE ENCOUNTER
PA Initiation    Medication: COSENTYX  Insurance Company:    Pharmacy Filling the Rx: [facility] [place], [place] - [address] [place] [address] [phone]  Filling Pharmacy Phone: [phone]  Filling Pharmacy Fax: [phone]  Start Date: 1/23/2019    [facility] Authorization Team   Phone: [phone]  Fax: [phone]

## 2019-01-28 NOTE — TELEPHONE ENCOUNTER
PA approved.  Effective date: 12/25/2018- 01/25/2020  PA reference #: unknown  Pt. notified:   Yes   Pt. informed directly.    [facility] Prior Authorization Team   Phone: [phone]  Fax: [phone]

## 2019-02-06 NOTE — TELEPHONE ENCOUNTER
Patient has high deductible accumulator plan - patient ok'd copay with pharmacy before w/o copay card offer from pharmacy personal - patient denied copay assistance

## 2019-02-26 ENCOUNTER — MYC MEDICAL ADVICE (OUTPATIENT)
Dept: RHEUMATOLOGY | Facility: CLINIC | Age: 54
End: 2019-02-26

## 2019-03-28 DIAGNOSIS — L40.50 PSORIATIC ARTHRITIS (H): ICD-10-CM

## 2019-04-03 ENCOUNTER — OFFICE VISIT (OUTPATIENT)
Dept: RHEUMATOLOGY | Facility: CLINIC | Age: 54
End: 2019-04-03
Attending: INTERNAL MEDICINE
Payer: COMMERCIAL

## 2019-04-03 VITALS
SYSTOLIC BLOOD PRESSURE: 129 MMHG | BODY MASS INDEX: 32.5 KG/M2 | HEART RATE: 74 BPM | DIASTOLIC BLOOD PRESSURE: 84 MMHG | OXYGEN SATURATION: 97 % | WEIGHT: 176.59 LBS | TEMPERATURE: 97.5 F | HEIGHT: 62 IN | RESPIRATION RATE: 16 BRPM

## 2019-04-03 DIAGNOSIS — L40.50 PSORIATIC ARTHRITIS (H): ICD-10-CM

## 2019-04-03 PROCEDURE — G0463 HOSPITAL OUTPT CLINIC VISIT: HCPCS | Mod: ZF

## 2019-04-03 ASSESSMENT — MIFFLIN-ST. JEOR: SCORE: 1349.25

## 2019-04-03 ASSESSMENT — PAIN SCALES - GENERAL: PAINLEVEL: MILD PAIN (2)

## 2019-04-03 NOTE — NURSING NOTE
"Chief Complaint   Patient presents with     RECHECK     Psoriatic arthritis     Vital signs:  Temp: 97.5  F (36.4  C) Temp src: Oral BP: 129/84 Pulse: 74   Resp: 16 SpO2: 97 %     Height: 157.5 cm (5' 2\") Weight: 80.1 kg (176 lb 9.4 oz)  Estimated body mass index is 32.3 kg/m  as calculated from the following:    Height as of this encounter: 1.575 m (5' 2\").    Weight as of this encounter: 80.1 kg (176 lb 9.4 oz).          Lisa Em [facility]  4/3/2019 9:23 AM      "

## 2019-04-03 NOTE — PROGRESS NOTES
"[facility]  Rheumatology Clinic  Joe Rodriguez MD  2019     Name: Veronica Sen  MRN: [id number]  Age: 53 year old  : 1965  Referring provider: Aide Mccord     Assessment and Plan:  Psoriatic arthritis:  Ariana reports much improved reduction of joint pain in hands and feet due to PsA, noting the loss of \"feeling like a stack of books was on my hands.\" This has been since she changed her Cosentyx regimen in Dec 2018 from 300mg q4wks to 150mg q2wks, as cosentyx benefits for her joint pain were previously wearing off by the end of the third week. She also reports nightly ibuprofen for joint pains for the past 2 years. Physical exam is only notable for some 5th DIP turning bilaterally, and some 1st MCP tenderness on left, likely due to OA. Otherwise she can make a fist, has 5/5 intrinsic finger strength, and no tenderness to palpation, reduced ROM, or synovitis of the joints of the hands or feet, bilaterally. 19 BMP wnl.    PsA is under excellent control with a monotherapy of cosentyx for 18 months now. She has not started methotrexate and does not need to start it at this time. Given her daily, chronic, NSAID use, I recommend taht creatinine be screened in her next visit with her PCP. No firm data suggest that cosentyx can induce long-lasting remission in seronegative arthritis, but we will consider dose taper in 6 months if she continues to show >90% suppression of her polyarticular symptoms.     Plan   Continue 150mg cosentyx injections every 2 weeks.  Continue Ibuprofen 400-600 TID PRN  Screen creatinine at next PCP visit, in the next few months    Pt seen and examined with Dr. Joe Rodriguez on 4/3/19.  Chelita Watt, MS4    I saw this patient with the medical student, who acted as scribe for me. I agree with the findings and recommendations.    Joe Rodriguez M.D.  Staff Rheumatologist, [facility]  Pager [phone]    Follow-up: Return in about 6 months (around 10/3/2019).     HPI: "   Veronica Sen is a 53 year old female with a history of psoriatic arthritis who presents for follow-up. I last saw her on 10/31/18 at which time she reported a moderate reduction in over joint pain since starting Cosentyx in 2017. Plan at that time was to start methotrexate 10mg and continue Cosentyx 300mg.     Today, she reports that she has been doing well and has not had any recent episodes of small joint pain. Patient has not started methotrexate as her pain has continued to resolve since our last visit. She is continued on Cosentyx shots every 2 weeks, noting that she began to experience migraines while in the 3rd week of a 4 week dosing cycle. She has been tolerating this new dosing cycle well and denies injection site reactions. She continues to have some difficulty with pincer grasp motions, but is better able to perform daily activities such as opening jars. Has some right knee pain that she attributes to a previous meniscectomy, but otherwise she has no other joint concerns. Denies reoccurrence of psoriatic rashes.Takes 2 ibuprofen every night in addition to Cosentyx treatment.     In regards to her other concerns, she endorses shortness of breath while going up stairs. She attributes this to a lack of exercise.        Review of Systems:   Pertinent items are noted in HPI or as below, remainder of complete ROS is negative.      No recent problems with hearing or vision. No swallowing problems.   No breathing difficulty, shortness of breath, coughing, or wheezing  No chest pain or palpitations  No heart burn, indigestion, abdominal pain, nausea, vomiting, diarrhea  No urination problems, no bloody, cloudy urine, no dysuria  No numbing, tingling, weakness  No headaches or confusion  No rashes. No easy bleeding or bruising.     Active Medications:     Current Outpatient Medications:      acetaminophen (TYLENOL) 325 MG tablet, , Disp: , Rfl:      atorvastatin (LIPITOR) 10 MG tablet, , Disp: , Rfl:       "Cetirizine HCl (ZYRTEC ALLERGY PO), Take 10 mg by mouth as needed, Disp: , Rfl:      ibuprofen (ADVIL/MOTRIN) 200 MG tablet, Take by mouth as needed, Disp: , Rfl:      methotrexate 2.5 MG tablet CHEMO, Take 4 tablets by mouth every 7 days., Disp: 24 tablet, Rfl: 4     OMEPRAZOLE PO, Take 20 mg by mouth every morning, Disp: , Rfl:      secukinumab (COSENTYX SENSOREADY PEN) 150 MG/ML Sensoready pen, Inject 2 mLs (300 mg) Subcutaneous every 28 days Hold for signs of infection, and then seek medical attention., Disp: 6 mL, Rfl: 5     SUMAtriptan Succinate (IMITREX PO), Take 50 mg by mouth as needed for migraine, Disp: , Rfl:      VITAMIN D, CHOLECALCIFEROL, PO, Take 15,000 Units by mouth once a week, Disp: , Rfl:       Allergies:   Hydrocodone  Morphine      Past Medical History:  Lateral epicondylitis  Migraine headache  GERD (gastroesophageal reflux disease)  Allergic rhinitis  Vision loss  Sinusitis  Back pain  Headache(784.0)  Allergy  Heartburn  Abdominal pain  Urinary tract infection  Menstrual cycle disorder  Pelvic pain  Varicella  Shingles  Transaminitis  Swelling, mass, or lump in head and neck  OA (osteoarthritis)  Diverticula of colon  Intestinal diverticular abscess  Motion sickness  Finger pain  Psoriatic arthritis (HCC)    She has been seen in  by Dr. Madrigal, Rheum, who diagnosed \"erosive osteoarthritis\". TSH, ESR, KRYSTA, Lyme were all negative in . Started Mtx 15 mg weekly in -15. Discontinued mtx in 2-16 d/t lack of efficacy. SSZ d/c'd in 3-16 d/t GI intolerance. Humira trial 2016: no efficacy. Otezla  start--d/c due to headaches. Started goliumab in , stopped due to no efficacy. Cosentyx started 10-17, increased from 150mg to 300mg in .        Past Surgical History:    Endometrial ablation  Sinus surgery  Partial coloctomy  meniscectomy    Family History:   No past pertinent family history.     Social History:   No smoking  Unknown smokeless tobacco or alcohol " "use  ; lives with  and children      Physical Exam:   /84 (BP Location: Right arm, Patient Position: Sitting, Cuff Size: Adult Regular)   Pulse 74   Temp 97.5  F (36.4  C) (Oral)   Resp 16   Ht 1.575 m (5' 2\")   Wt 80.1 kg (176 lb 9.4 oz)   SpO2 97%   BMI 32.30 kg/m     Wt Readings from Last 4 Encounters:   04/03/19 80.1 kg (176 lb 9.4 oz)   10/31/18 78.5 kg (173 lb)   10/26/18 74.8 kg (165 lb)   06/27/18 76 kg (167 lb 8 oz)     Constitutional: Well-developed, appearing stated age; cooperative  Eyes: Normal EOM, PERRLA, vision, conjunctiva, sclera  ENT: Normal external ears, nose, hearing, lips, teeth, gums, throat. No mucous membrane lesions, normal saliva pool  Neck: No mass or thyroid enlargement  Resp: Lungs clear to auscultation, nl to palpation  CV: RRR, no murmurs, rubs or gallops, no edema  GI: No ABD mass or tenderness, no HSM  : Not tested  Lymph: No cervical, supraclavicular, inguinal or epitrochlear nodes  MS:  Enlarged DIP joints on the 2nd and 5th fingers on the right and on the left. Enlarged PIP joints on left 5th and right 5th finger. Decreased ROM with incomplete extension in the right 5th DIP joint. Able to make full composite fist bilaterally. Full ankle ROM without tenderness. Full MTP ROM without tenderness. Limited plantar flexion in 1st toes bilaterally. Excellent  strength. Good intrinsic finger strength bilaterally. Distal phalangeal angulation in 5th digits bilaterally. Not tenderness or synovial thickening at MCPs or PIPs. Left 1st CMC joint tenderness. MTP compression is negative. Mid-feet are normal.   Skin: No nail pitting, alopecia, rash, nodules or lesions  Neuro: Normal cranial nerves, strength, sensation, DTRs. 5/5 biceps.   Psych: Normal judgement, orientation, memory, affect.       Laboratory:   RHEUM RESULTS Latest Ref Rng & Units 11/17/2017 6/27/2018 10/31/2018   CRP, INFLAMMATION 0.0 - 8.0 mg/L <2.9 <2.9 -   AST 0 - 45 U/L 33 22 20   ALT 0 - 50 " U/L 40 32 36   ALBUMIN 3.5 - 5.0 g/dL - - -   WBC 4.0 - 11.0 10e9/L 6.5 5.9 6.5   RBC 3.8 - 5.2 10e12/L 4.45 4.64 4.41   HGB 11.7 - 15.7 g/dL 13.8 14.1 13.6   HCT 35.0 - 47.0 % 42.2 43.2 41.5   MCV 78 - 100 fl 95 93 94   MCHC 31.5 - 36.5 g/dL 32.7 32.6 32.8   RDW 10.0 - 15.0 % 12.4 12.0 12.2    - 450 10e9/L 216 212 227   CREATININE 0.52 - 1.04 mg/dL 0.65 0.66 0.69   GFR ESTIMATE, IF BLACK >60 mL/min/1.7m2 >90 >90 >90   GFR ESTIMATE >60 mL/min/1.7m2 >90 >90 89   QUANTIFERON TB GOLD neg Text - - -         QuantiFERON TB Gold   Date Value Ref Range Status   05/23/2017 neg neg Text Final            Scribe Disclosure:   I, Aric Strong, am serving as a scribe to document services personally performed by Joe Rodriguez MD at this visit, based upon the provider's statements to me. All documentation has been reviewed by the aforementioned provider prior to being entered into the official medical record.

## 2019-04-03 NOTE — PATIENT INSTRUCTIONS
Diagnosis: Psoriatic arthritis    Plan:  - Continue Cosentyx injections every 2 weeks for the next 6 months. We may consider decreasing dosage if tolerated well.   - Hold methotrexate.  - Check creatinine in 6-2019

## 2019-04-03 NOTE — LETTER
"4/3/2019      RE: Veronica Sen  [address] [address] [address]       [facility]  Rheumatology Clinic  Jeo Rodriguez MD  2019     Name: Veronica Sen  MRN: [id number]  Age: 53 year old  : 1965  Referring provider: Aide Mccord     Assessment and Plan:  Psoriatic arthritis:  Ariana reports much improved reduction of joint pain in hands and feet due to PsA, noting the loss of \"feeling like a stack of books was on my hands.\" This has been since she changed her Cosentyx regimen in Dec 2018 from 300mg q4wks to 150mg q2wks, as cosentyx benefits for her joint pain were previously wearing off by the end of the third week. She also reports nightly ibuprofen for joint pains for the past 2 years. Physical exam is only notable for some 5th DIP turning bilaterally, and some 1st MCP tenderness on left, likely due to OA. Otherwise she can make a fist, has 5/5 intrinsic finger strength, and no tenderness to palpation, reduced ROM, or synovitis of the joints of the hands or feet, bilaterally. 19 BMP wnl.    PsA is under excellent control with a monotherapy of cosentyx for 18 months now. She has not started methotrexate and does not need to start it at this time. Given her daily, chronic, NSAID use, I recommend taht creatinine be screened in her next visit with her PCP. No firm data suggest that cosentyx can induce long-lasting remission in seronegative arthritis, but we will consider dose taper in 6 months if she continues to show >90% suppression of her polyarticular symptoms.     Plan   Continue 150mg cosentyx injections every 2 weeks.  Continue Ibuprofen 400-600 TID PRN  Screen creatinine at next PCP visit, in the next few months    Pt seen and examined with Dr. Joe Rodriguez on 4/3/19.  Chelita Watt, MS4    I saw this patient with the medical student, who acted as scribe for me. I agree with the findings and recommendations.    Joe Rodriguez M.D.  Staff Rheumatologist, [facility]  Pager " [phone]    Follow-up: Return in about 6 months (around 10/3/2019).     HPI:   Veronica Sen is a 53 year old female with a history of psoriatic arthritis who presents for follow-up. I last saw her on 10/31/18 at which time she reported a moderate reduction in over joint pain since starting Cosentyx in 2017. Plan at that time was to start methotrexate 10mg and continue Cosentyx 300mg.     Today, she reports that she has been doing well and has not had any recent episodes of small joint pain. Patient has not started methotrexate as her pain has continued to resolve since our last visit. She is continued on Cosentyx shots every 2 weeks, noting that she began to experience migraines while in the 3rd week of a 4 week dosing cycle. She has been tolerating this new dosing cycle well and denies injection site reactions. She continues to have some difficulty with pincer grasp motions, but is better able to perform daily activities such as opening jars. Has some right knee pain that she attributes to a previous meniscectomy, but otherwise she has no other joint concerns. Denies reoccurrence of psoriatic rashes.Takes 2 ibuprofen every night in addition to Cosentyx treatment.     In regards to her other concerns, she endorses shortness of breath while going up stairs. She attributes this to a lack of exercise.        Review of Systems:   Pertinent items are noted in HPI or as below, remainder of complete ROS is negative.      No recent problems with hearing or vision. No swallowing problems.   No breathing difficulty, shortness of breath, coughing, or wheezing  No chest pain or palpitations  No heart burn, indigestion, abdominal pain, nausea, vomiting, diarrhea  No urination problems, no bloody, cloudy urine, no dysuria  No numbing, tingling, weakness  No headaches or confusion  No rashes. No easy bleeding or bruising.     Active Medications:     Current Outpatient Medications:      acetaminophen (TYLENOL) 325 MG  "tablet, , Disp: , Rfl:      atorvastatin (LIPITOR) 10 MG tablet, , Disp: , Rfl:      Cetirizine HCl (ZYRTEC ALLERGY PO), Take 10 mg by mouth as needed, Disp: , Rfl:      ibuprofen (ADVIL/MOTRIN) 200 MG tablet, Take by mouth as needed, Disp: , Rfl:      methotrexate 2.5 MG tablet CHEMO, Take 4 tablets by mouth every 7 days., Disp: 24 tablet, Rfl: 4     OMEPRAZOLE PO, Take 20 mg by mouth every morning, Disp: , Rfl:      secukinumab (COSENTYX SENSOREADY PEN) 150 MG/ML Sensoready pen, Inject 2 mLs (300 mg) Subcutaneous every 28 days Hold for signs of infection, and then seek medical attention., Disp: 6 mL, Rfl: 5     SUMAtriptan Succinate (IMITREX PO), Take 50 mg by mouth as needed for migraine, Disp: , Rfl:      VITAMIN D, CHOLECALCIFEROL, PO, Take 15,000 Units by mouth once a week, Disp: , Rfl:       Allergies:   Hydrocodone  Morphine      Past Medical History:  Lateral epicondylitis  Migraine headache  GERD (gastroesophageal reflux disease)  Allergic rhinitis  Vision loss  Sinusitis  Back pain  Headache(784.0)  Allergy  Heartburn  Abdominal pain  Urinary tract infection  Menstrual cycle disorder  Pelvic pain  Varicella  Shingles  Transaminitis  Swelling, mass, or lump in head and neck  OA (osteoarthritis)  Diverticula of colon  Intestinal diverticular abscess  Motion sickness  Finger pain  Psoriatic arthritis (HCC)    She has been seen in  by Dr. Madrigal, Rheum, who diagnosed \"erosive osteoarthritis\". TSH, ESR, KRYSTA, Lyme were all negative in . Started Mtx 15 mg weekly in 9-15. Discontinued mtx in 2-16 d/t lack of efficacy. SSZ d/c'd in 3-16 d/t GI intolerance. Humira trial 2016: no efficacy. Otezla  start--d/c due to headaches. Started goliumab in , stopped due to no efficacy. Cosentyx started 10-17, increased from 150mg to 300mg in .        Past Surgical History:    Endometrial ablation  Sinus surgery  Partial coloctomy  meniscectomy    Family History:   No past pertinent family " "history.     Social History:   No smoking  Unknown smokeless tobacco or alcohol use  ; lives with  and children      Physical Exam:   /84 (BP Location: Right arm, Patient Position: Sitting, Cuff Size: Adult Regular)   Pulse 74   Temp 97.5  F (36.4  C) (Oral)   Resp 16   Ht 1.575 m (5' 2\")   Wt 80.1 kg (176 lb 9.4 oz)   SpO2 97%   BMI 32.30 kg/m      Wt Readings from Last 4 Encounters:   04/03/19 80.1 kg (176 lb 9.4 oz)   10/31/18 78.5 kg (173 lb)   10/26/18 74.8 kg (165 lb)   06/27/18 76 kg (167 lb 8 oz)     Constitutional: Well-developed, appearing stated age; cooperative  Eyes: Normal EOM, PERRLA, vision, conjunctiva, sclera  ENT: Normal external ears, nose, hearing, lips, teeth, gums, throat. No mucous membrane lesions, normal saliva pool  Neck: No mass or thyroid enlargement  Resp: Lungs clear to auscultation, nl to palpation  CV: RRR, no murmurs, rubs or gallops, no edema  GI: No ABD mass or tenderness, no HSM  : Not tested  Lymph: No cervical, supraclavicular, inguinal or epitrochlear nodes  MS:  Enlarged DIP joints on the 2nd and 5th fingers on the right and on the left. Enlarged PIP joints on left 5th and right 5th finger. Decreased ROM with incomplete extension in the right 5th DIP joint. Able to make full composite fist bilaterally. Full ankle ROM without tenderness. Full MTP ROM without tenderness. Limited plantar flexion in 1st toes bilaterally. Excellent  strength. Good intrinsic finger strength bilaterally. Distal phalangeal angulation in 5th digits bilaterally. Not tenderness or synovial thickening at MCPs or PIPs. Left 1st CMC joint tenderness. MTP compression is negative. Mid-feet are normal.   Skin: No nail pitting, alopecia, rash, nodules or lesions  Neuro: Normal cranial nerves, strength, sensation, DTRs. 5/5 biceps.   Psych: Normal judgement, orientation, memory, affect.       Laboratory:   RHEUM RESULTS Latest Ref Rng & Units 11/17/2017 6/27/2018 10/31/2018 "   CRP, INFLAMMATION 0.0 - 8.0 mg/L <2.9 <2.9 -   AST 0 - 45 U/L 33 22 20   ALT 0 - 50 U/L 40 32 36   ALBUMIN 3.5 - 5.0 g/dL - - -   WBC 4.0 - 11.0 10e9/L 6.5 5.9 6.5   RBC 3.8 - 5.2 10e12/L 4.45 4.64 4.41   HGB 11.7 - 15.7 g/dL 13.8 14.1 13.6   HCT 35.0 - 47.0 % 42.2 43.2 41.5   MCV 78 - 100 fl 95 93 94   MCHC 31.5 - 36.5 g/dL 32.7 32.6 32.8   RDW 10.0 - 15.0 % 12.4 12.0 12.2    - 450 10e9/L 216 212 227   CREATININE 0.52 - 1.04 mg/dL 0.65 0.66 0.69   GFR ESTIMATE, IF BLACK >60 mL/min/1.7m2 >90 >90 >90   GFR ESTIMATE >60 mL/min/1.7m2 >90 >90 89   QUANTIFERON TB GOLD neg Text - - -     QuantiFERON TB Gold   Date Value Ref Range Status   05/23/2017 neg neg Text Final     Scribe Disclosure:   I, Aric Strong, am serving as a scribe to document services personally performed by Joe Rodriguez MD at this visit, based upon the provider's statements to me. All documentation has been reviewed by the aforementioned provider prior to being entered into the official medical record.       Joe Rodriguez MD

## 2019-04-03 NOTE — LETTER
"4/3/2019       RE: Veronica Sen  [id number] [address] [address]     Dear Colleague,    Thank you for referring your patient, Veronica Sen, to the [facility] RHEUMATOLOGY at [facility]. Please see a copy of my visit note below.    [facility]  Rheumatology Clinic  Joe Rodriguez MD  2019     Name: Veronica Sen  MRN: [id number]  Age: 53 year old  : 1965  Referring provider: Aide Mccord     Assessment and Plan:  Psoriatic arthritis:  Ariana reports much improved reduction of joint pain in hands and feet due to PsA, noting the loss of \"feeling like a stack of books was on my hands.\" This has been since she changed her Cosentyx regimen in Dec 2018 from 300mg q4wks to 150mg q2wks, as cosentyx benefits for her joint pain were previously wearing off by the end of the third week. She also reports nightly ibuprofen for joint pains for the past 2 years. Physical exam is only notable for some 5th DIP turning bilaterally, and some 1st MCP tenderness on left, likely due to OA. Otherwise she can make a fist, has 5/5 intrinsic finger strength, and no tenderness to palpation, reduced ROM, or synovitis of the joints of the hands or feet, bilaterally. 19 BMP wnl.    PsA is under excellent control with a monotherapy of cosentyx for 18 months now. She has not started methotrexate and does not need to start it at this time. Given her daily, chronic, NSAID use, I recommend taht creatinine be screened in her next visit with her PCP. No firm data suggest that cosentyx can induce long-lasting remission in seronegative arthritis, but we will consider dose taper in 6 months if she continues to show >90% suppression of her polyarticular symptoms.     Plan   Continue 150mg cosentyx injections every 2 weeks.  Continue Ibuprofen 400-600 TID PRN  Screen creatinine at next PCP visit, in the next few months    Pt seen and examined with Dr. Joe Rodriguez on " 4/3/19.  Chelita Watt, MS4    I saw this patient with the medical student, who acted as scribe for me. I agree with the findings and recommendations.    Joe Rodriguez M.D.  Staff Rheumatologist,  Health  Pager [phone]    Follow-up: Return in about 6 months (around 10/3/2019).     HPI:   Veronica Sen is a 53 year old female with a history of psoriatic arthritis who presents for follow-up. I last saw her on 10/31/18 at which time she reported a moderate reduction in over joint pain since starting Cosentyx in 2017. Plan at that time was to start methotrexate 10mg and continue Cosentyx 300mg.     Today, she reports that she has been doing well and has not had any recent episodes of small joint pain. Patient has not started methotrexate as her pain has continued to resolve since our last visit. She is continued on Cosentyx shots every 2 weeks, noting that she began to experience migraines while in the 3rd week of a 4 week dosing cycle. She has been tolerating this new dosing cycle well and denies injection site reactions. She continues to have some difficulty with pincer grasp motions, but is better able to perform daily activities such as opening jars. Has some right knee pain that she attributes to a previous meniscectomy, but otherwise she has no other joint concerns. Denies reoccurrence of psoriatic rashes.Takes 2 ibuprofen every night in addition to Cosentyx treatment.     In regards to her other concerns, she endorses shortness of breath while going up stairs. She attributes this to a lack of exercise.        Review of Systems:   Pertinent items are noted in HPI or as below, remainder of complete ROS is negative.      No recent problems with hearing or vision. No swallowing problems.   No breathing difficulty, shortness of breath, coughing, or wheezing  No chest pain or palpitations  No heart burn, indigestion, abdominal pain, nausea, vomiting, diarrhea  No urination problems, no bloody, cloudy urine,  "no dysuria  No numbing, tingling, weakness  No headaches or confusion  No rashes. No easy bleeding or bruising.     Active Medications:     Current Outpatient Medications:      acetaminophen (TYLENOL) 325 MG tablet, , Disp: , Rfl:      atorvastatin (LIPITOR) 10 MG tablet, , Disp: , Rfl:      Cetirizine HCl (ZYRTEC ALLERGY PO), Take 10 mg by mouth as needed, Disp: , Rfl:      ibuprofen (ADVIL/MOTRIN) 200 MG tablet, Take by mouth as needed, Disp: , Rfl:      methotrexate 2.5 MG tablet CHEMO, Take 4 tablets by mouth every 7 days., Disp: 24 tablet, Rfl: 4     OMEPRAZOLE PO, Take 20 mg by mouth every morning, Disp: , Rfl:      secukinumab (COSENTYX SENSOREADY PEN) 150 MG/ML Sensoready pen, Inject 2 mLs (300 mg) Subcutaneous every 28 days Hold for signs of infection, and then seek medical attention., Disp: 6 mL, Rfl: 5     SUMAtriptan Succinate (IMITREX PO), Take 50 mg by mouth as needed for migraine, Disp: , Rfl:      VITAMIN D, CHOLECALCIFEROL, PO, Take 15,000 Units by mouth once a week, Disp: , Rfl:       Allergies:   Hydrocodone  Morphine      Past Medical History:  Lateral epicondylitis  Migraine headache  GERD (gastroesophageal reflux disease)  Allergic rhinitis  Vision loss  Sinusitis  Back pain  Headache(784.0)  Allergy  Heartburn  Abdominal pain  Urinary tract infection  Menstrual cycle disorder  Pelvic pain  Varicella  Shingles  Transaminitis  Swelling, mass, or lump in head and neck  OA (osteoarthritis)  Diverticula of colon  Intestinal diverticular abscess  Motion sickness  Finger pain  Psoriatic arthritis (HCC)    She has been seen in 2013 by Dr. Madrigal, Rheum, who diagnosed \"erosive osteoarthritis\". TSH, ESR, KRYSTA, Lyme were all negative in 2014. Started Mtx 15 mg weekly in 9-15. Discontinued mtx in 2-16 d/t lack of efficacy. SSZ d/c'd in 3-16 d/t GI intolerance. Humira trial 2016: no efficacy. Otezla 1-17 start--d/c due to headaches. Started goliumab in 8-17, stopped due to no efficacy. Cosentyx started " "10-17, increased from 150mg to 300mg in .        Past Surgical History:    Endometrial ablation  Sinus surgery  Partial coloctomy  meniscectomy    Family History:   No past pertinent family history.     Social History:   No smoking  Unknown smokeless tobacco or alcohol use  ; lives with  and children      Physical Exam:   /84 (BP Location: Right arm, Patient Position: Sitting, Cuff Size: Adult Regular)   Pulse 74   Temp 97.5  F (36.4  C) (Oral)   Resp 16   Ht 1.575 m (5' 2\")   Wt 80.1 kg (176 lb 9.4 oz)   SpO2 97%   BMI 32.30 kg/m      Wt Readings from Last 4 Encounters:   19 80.1 kg (176 lb 9.4 oz)   10/31/18 78.5 kg (173 lb)   10/26/18 74.8 kg (165 lb)   18 76 kg (167 lb 8 oz)     Constitutional: Well-developed, appearing stated age; cooperative  Eyes: Normal EOM, PERRLA, vision, conjunctiva, sclera  ENT: Normal external ears, nose, hearing, lips, teeth, gums, throat. No mucous membrane lesions, normal saliva pool  Neck: No mass or thyroid enlargement  Resp: Lungs clear to auscultation, nl to palpation  CV: RRR, no murmurs, rubs or gallops, no edema  GI: No ABD mass or tenderness, no HSM  : Not tested  Lymph: No cervical, supraclavicular, inguinal or epitrochlear nodes  MS:  Enlarged DIP joints on the 2nd and 5th fingers on the right and on the left. Enlarged PIP joints on left 5th and right 5th finger. Decreased ROM with incomplete extension in the right 5th DIP joint. Able to make full composite fist bilaterally. Full ankle ROM without tenderness. Full MTP ROM without tenderness. Limited plantar flexion in 1st toes bilaterally. Excellent  strength. Good intrinsic finger strength bilaterally. Distal phalangeal angulation in 5th digits bilaterally. Not tenderness or synovial thickening at MCPs or PIPs. Left 1st CMC joint tenderness. MTP compression is negative. Mid-feet are normal.   Skin: No nail pitting, alopecia, rash, nodules or lesions  Neuro: Normal " cranial nerves, strength, sensation, DTRs. 5/5 biceps.   Psych: Normal judgement, orientation, memory, affect.       Laboratory:   RHEUM RESULTS Latest Ref Rng & Units 11/17/2017 6/27/2018 10/31/2018   CRP, INFLAMMATION 0.0 - 8.0 mg/L <2.9 <2.9 -   AST 0 - 45 U/L 33 22 20   ALT 0 - 50 U/L 40 32 36   ALBUMIN 3.5 - 5.0 g/dL - - -   WBC 4.0 - 11.0 10e9/L 6.5 5.9 6.5   RBC 3.8 - 5.2 10e12/L 4.45 4.64 4.41   HGB 11.7 - 15.7 g/dL 13.8 14.1 13.6   HCT 35.0 - 47.0 % 42.2 43.2 41.5   MCV 78 - 100 fl 95 93 94   MCHC 31.5 - 36.5 g/dL 32.7 32.6 32.8   RDW 10.0 - 15.0 % 12.4 12.0 12.2    - 450 10e9/L 216 212 227   CREATININE 0.52 - 1.04 mg/dL 0.65 0.66 0.69   GFR ESTIMATE, IF BLACK >60 mL/min/1.7m2 >90 >90 >90   GFR ESTIMATE >60 mL/min/1.7m2 >90 >90 89   QUANTIFERON TB GOLD neg Text - - -         QuantiFERON TB Gold   Date Value Ref Range Status   05/23/2017 neg neg Text Final            Scribe Disclosure:   I, Aric Strong, am serving as a scribe to document services personally performed by Joe Rodriguez MD at this visit, based upon the provider's statements to me. All documentation has been reviewed by the aforementioned provider prior to being entered into the official medical record.       Again, thank you for allowing me to participate in the care of your patient.      Sincerely,    Joe Rodriguez MD

## 2019-10-04 ENCOUNTER — HEALTH MAINTENANCE LETTER (OUTPATIENT)
Age: 54
End: 2019-10-04

## 2019-11-16 ENCOUNTER — OFFICE VISIT (OUTPATIENT)
Dept: RHEUMATOLOGY | Facility: CLINIC | Age: 54
End: 2019-11-16
Attending: INTERNAL MEDICINE
Payer: COMMERCIAL

## 2019-11-16 VITALS
HEART RATE: 81 BPM | WEIGHT: 176 LBS | DIASTOLIC BLOOD PRESSURE: 78 MMHG | TEMPERATURE: 98.5 F | OXYGEN SATURATION: 96 % | BODY MASS INDEX: 32.19 KG/M2 | SYSTOLIC BLOOD PRESSURE: 124 MMHG

## 2019-11-16 DIAGNOSIS — L40.50 PSORIATIC ARTHRITIS (H): Primary | ICD-10-CM

## 2019-11-16 PROCEDURE — G0463 HOSPITAL OUTPT CLINIC VISIT: HCPCS | Mod: ZF

## 2019-11-16 RX ORDER — SIMVASTATIN 20 MG
TABLET ORAL
COMMUNITY
Start: 2019-10-07

## 2019-11-16 ASSESSMENT — PAIN SCALES - GENERAL: PAINLEVEL: NO PAIN (0)

## 2019-11-16 NOTE — LETTER
2019      RE: Veronica Sen  [address] [address] [address]       [facility]  Rheumatology Clinic  Joe Rodriguez MD  2019     Name: Veronica Sen  MRN: [id number]  Age: 53 year old  : 1965  Referring provider: Aide Mccord    Assessment and Plan:  # Psoriatic arthritis:  Patient relates persistent, major relief from former small joint predominant arthralgia. Exam shows no tenderness or synovial thickening in the joints of the hands and fingers. There is no skin disease. Blood work from 2018 showed creatinine, transaminases, and CBC as all normal.    Psoriatic arthritis continues well-controlled on Cosentyx, started in mid-. I recommend continuing Cosentyx 150 mg. Patient may space out injection frequency to every 3 weeks if she experiences dose cycle-dependent recurrence, she should resume every other week dosing. While on Cosentyx I recommend every 4-6 moths measurement of creatinine, LFTs, and CBC. Return to clinic in 5-6 months.     #OA, 1rst CMC: continue use of splints; IA injection may be repeated q 3-4 mos.    Orders:  - Creatinine  - CBC with platelets  - ALT  - AST  - CRP inflammation  - secukinumab (COSENTYX SENSOREADY PEN) 150 MG/ML Sensoready pen  Dispense: 6 mL; Refill: 5    Follow-up: Return in about 5 months (around 2020).    HPI:  Veronica Sen is a 53 year old female with a history including psoriatic arthritis who presents for follow-up. I last saw the patient on 2019, at which time the patient reported much improved reduction of joint pain in hands and feet since changing her Cosentyx regimen in 2018 from 300 mg q4wks to 150 mg q2wks. We planned to continue 150 mg Cosentyx injections every 2 weeks.    Today, the patient reports that she has been pretty well. She did make a switch to atorvastatin this Spring and, after around one week, this made a large difference in abating her thigh/leg pain. She states that she  still has a slight heaviness in the hands, but she denies any flare ups. She feels that dosing cycle-dependent symptoms have improved on Cosentyx, dosing every other week. She adds that she did have a period of cold-like illness in which she spaced out her Cosentyx doses by an additional week. She feels like she has been at a point for about 4-5 months where she has been open to reducing the frequency of Cosentyx injections. She states that she does not frequently take ibuprofen.    She states that she did have right CMC joint pain previously and she received a joint injection in this area which provided relief. She explains that her left CMC joint is now having a similar pain presentation.      Review of Systems:   Pertinent items are noted in HPI or as below, remainder of complete ROS is negative.      No recent problems with hearing or vision. No swallowing problems.   No breathing difficulty, shortness of breath, coughing, or wheezing.  No chest pain or palpitations.  No heart burn, indigestion, abdominal pain, nausea, vomiting, diarrhea.  No urination problems, no bloody, cloudy urine, no dysuria.  No numbing, tingling, weakness.  No headaches or confusion.  No rashes. No easy bleeding or bruising.     Active Medications:   Current Outpatient Medications:      acetaminophen (TYLENOL) 325 MG tablet, , Disp: , Rfl:      Cetirizine HCl (ZYRTEC ALLERGY PO), Take 10 mg by mouth as needed, Disp: , Rfl:      ibuprofen (ADVIL/MOTRIN) 200 MG tablet, Take by mouth as needed, Disp: , Rfl:      OMEPRAZOLE PO, Take 20 mg by mouth every morning, Disp: , Rfl:      secukinumab (COSENTYX SENSOREADY PEN) 150 MG/ML Sensoready pen, Inject 2 mLs (300 mg) Subcutaneous every 28 days Hold for signs of infection, and then seek medical attention., Disp: 6 mL, Rfl: 5     simvastatin (ZOCOR) 20 MG tablet, TAKE 1 TABLET BY MOUTH EVERYDAY AT BEDTIME, Disp: , Rfl:      SUMAtriptan Succinate (IMITREX PO), Take 50 mg by mouth as needed for  migraine, Disp: , Rfl:      VITAMIN D, CHOLECALCIFEROL, PO, Take 15,000 Units by mouth three times a week , Disp: , Rfl:      atorvastatin (LIPITOR) 10 MG tablet, , Disp: , Rfl:      methotrexate 2.5 MG tablet CHEMO, Take 4 tablets by mouth every 7 days. (Patient not taking: Reported on 4/3/2019), Disp: 24 tablet, Rfl: 4    Allergies:  Hydrocodone   Morphine     Past Medical History:  Psoriatic arthritis  Neck pain  Intestinal diverticular abscess   Colonic diverticular abscess   Diverticula of colon   Diverticulitis   Osteoarthritis of finger   Long term current use of non-steroidal anti-inflammatories (NSAID)   Chronic migraine without aura   Headache   Female pelvic pain     Past Surgical History:   section x3  Endometrial ablation   Sinus surgery   Colectomy, partial 2015  Colostomy takedown 2015  Knee arthroscopy with meniscectomy, left 2017  Hysterectomy     Family History:    Brother- Psoriasis, hypertension  Father- Arthritis, heart disease, sleep apnea  Mother- Hypertension, psoriasis  Sister- Down's syndrome, heart disease  Maternal grandfather- heart disease  Maternal grandmother- Hemolytic anemia  Paternal grandmother- Diabetes    Social History:  The patient reports that she has never smoked. She has never used smokeless tobacco. She reports current alcohol use. She reports that she does not use drugs.   PCP: Aide Mccord  Marital Status:     Physical Exam:   /78   Pulse 81   Temp 98.5  F (36.9  C)   Wt 79.8 kg (176 lb)   SpO2 96%   BMI 32.19 kg/m      Wt Readings from Last 4 Encounters:   19 79.8 kg (176 lb)   19 80.1 kg (176 lb 9.4 oz)   10/31/18 78.5 kg (173 lb)   10/26/18 74.8 kg (165 lb)     Constitutional: Well-developed, appearing stated age; cooperative.  Eyes: Normal EOM, PERRLA, vision, conjunctiva, sclera.  ENT: Normal external ears, nose, hearing, lips, teeth, gums, throat. No mucous membrane lesions, normal saliva pool.  Neck:  No mass or thyroid enlargement.  Resp: Lungs clear to auscultation, nl to palpation.  CV: RRR, no murmurs, rubs or gallops, no edema.  GI: No ABD mass or tenderness, no HSM.  : Not tested.  Lymph: No cervical, supraclavicular, inguinal or epitrochlear nodes.  MS: The TMJ, neck, shoulder, elbow, wrist, spine, hip, and knee were examined and found normal. No active synovitis or altered joint anatomy. Full joint ROM. Normal  strength. No dactylitis, tenosynovitis, enthesopathy. Previously noted flexion contracture in the right 5th DIP, otherwise good alignment of digits. Normal flexion and extension of finger joints. Tenderness at the 1st CMC on the left. Pain with circumduction of the left thumb. Ankles are cool, there is no swelling or tenderness about the tibiotalar joints.   Skin: No nail pitting, alopecia, rash, nodules or lesions.  Neuro: Normal cranial nerves, strength, sensation, DTRs.   Psych: Normal judgement, orientation, memory, affect.     Laboratory:   RHEUM RESULTS Latest Ref Rng & Units 11/17/2017 6/27/2018 10/31/2018   CRP, INFLAMMATION 0.0 - 8.0 mg/L <2.9 <2.9 -   AST 0 - 45 U/L 33 22 20   ALT 0 - 50 U/L 40 32 36   ALBUMIN 3.5 - 5.0 g/dL - - -   WBC 4.0 - 11.0 10e9/L 6.5 5.9 6.5   RBC 3.8 - 5.2 10e12/L 4.45 4.64 4.41   HGB 11.7 - 15.7 g/dL 13.8 14.1 13.6   HCT 35.0 - 47.0 % 42.2 43.2 41.5   MCV 78 - 100 fl 95 93 94   MCHC 31.5 - 36.5 g/dL 32.7 32.6 32.8   RDW 10.0 - 15.0 % 12.4 12.0 12.2    - 450 10e9/L 216 212 227   CREATININE 0.52 - 1.04 mg/dL 0.65 0.66 0.69   GFR ESTIMATE, IF BLACK >60 mL/min/1.7m2 >90 >90 >90   GFR ESTIMATE >60 mL/min/1.7m2 >90 >90 89   QUANTIFERON TB GOLD neg Text - - -     QuantiFERON TB Gold   Date Value Ref Range Status   05/23/2017 neg neg Text Final     Scribe Disclosure:  I, Alfredo Mehta, am serving as a scribe to document services personally performed by Joe Rodriguez MD at this visit, based upon the provider's statements to me. All documentation has  been reviewed by the aforementioned provider prior to being entered into the official medical record.       Joe Rodriguez MD

## 2019-11-16 NOTE — NURSING NOTE
"Chief Complaint   Patient presents with     RECHECK     6 month follow up psoriatic arthritis     Vital signs:  Temp: 98.5  F (36.9  C)   BP: 124/78 Pulse: 81     SpO2: 96 %       Weight: 79.8 kg (176 lb)  Estimated body mass index is 32.19 kg/m  as calculated from the following:    Height as of 4/3/19: 1.575 m (5' 2\").    Weight as of this encounter: 79.8 kg (176 lb).        Marce Amador, CMA    "

## 2019-11-16 NOTE — PATIENT INSTRUCTIONS
Diagnosis:  1. Psoriatic arthritis, improved control on cosentyx    Plan:  1. Look into obtaining a 1st CMC splint.  2. Start using Cosentyx every 3rd week 150 mg. Monitor for recurrent joint pain late in the dosing cycle.  3. Bloodwork to monitor blood counts, creatinine, liver function.

## 2019-11-16 NOTE — PROGRESS NOTES
[facility]  Rheumatology Clinic  Joe Rodriguez MD  2019     Name: Veronica Sen  MRN: [id number]  Age: 53 year old  : 1965  Referring provider: Aide Mccord    Assessment and Plan:  # Psoriatic arthritis:  Patient relates persistent, major relief from former small joint predominant arthralgia. Exam shows no tenderness or synovial thickening in the joints of the hands and fingers. There is no skin disease. Blood work from 2018 showed creatinine, transaminases, and CBC as all normal.    Psoriatic arthritis continues well-controlled on Cosentyx, started in mid-. I recommend continuing Cosentyx 150 mg. Patient may space out injection frequency to every 3 weeks if she experiences dose cycle-dependent recurrence, she should resume every other week dosing. While on Cosentyx I recommend every 4-6 moths measurement of creatinine, LFTs, and CBC. Return to clinic in 5-6 months.     #OA, 1rst CMC: continue use of splints; IA injection may be repeated q 3-4 mos.    Orders:  - Creatinine  - CBC with platelets  - ALT  - AST  - CRP inflammation  - secukinumab (COSENTYX SENSOREADY PEN) 150 MG/ML Sensoready pen  Dispense: 6 mL; Refill: 5    Follow-up: Return in about 5 months (around 2020).    HPI:  Veronica Sen is a 53 year old female with a history including psoriatic arthritis who presents for follow-up. I last saw the patient on 2019, at which time the patient reported much improved reduction of joint pain in hands and feet since changing her Cosentyx regimen in 2018 from 300 mg q4wks to 150 mg q2wks. We planned to continue 150 mg Cosentyx injections every 2 weeks.    Today, the patient reports that she has been pretty well. She did make a switch to atorvastatin this Spring and, after around one week, this made a large difference in abating her thigh/leg pain. She states that she still has a slight heaviness in the hands, but she denies any flare ups. She  feels that dosing cycle-dependent symptoms have improved on Cosentyx, dosing every other week. She adds that she did have a period of cold-like illness in which she spaced out her Cosentyx doses by an additional week. She feels like she has been at a point for about 4-5 months where she has been open to reducing the frequency of Cosentyx injections. She states that she does not frequently take ibuprofen.    She states that she did have right CMC joint pain previously and she received a joint injection in this area which provided relief. She explains that her left CMC joint is now having a similar pain presentation.      Review of Systems:   Pertinent items are noted in HPI or as below, remainder of complete ROS is negative.      No recent problems with hearing or vision. No swallowing problems.   No breathing difficulty, shortness of breath, coughing, or wheezing.  No chest pain or palpitations.  No heart burn, indigestion, abdominal pain, nausea, vomiting, diarrhea.  No urination problems, no bloody, cloudy urine, no dysuria.  No numbing, tingling, weakness.  No headaches or confusion.  No rashes. No easy bleeding or bruising.     Active Medications:   Current Outpatient Medications:      acetaminophen (TYLENOL) 325 MG tablet, , Disp: , Rfl:      Cetirizine HCl (ZYRTEC ALLERGY PO), Take 10 mg by mouth as needed, Disp: , Rfl:      ibuprofen (ADVIL/MOTRIN) 200 MG tablet, Take by mouth as needed, Disp: , Rfl:      OMEPRAZOLE PO, Take 20 mg by mouth every morning, Disp: , Rfl:      secukinumab (COSENTYX SENSOREADY PEN) 150 MG/ML Sensoready pen, Inject 2 mLs (300 mg) Subcutaneous every 28 days Hold for signs of infection, and then seek medical attention., Disp: 6 mL, Rfl: 5     simvastatin (ZOCOR) 20 MG tablet, TAKE 1 TABLET BY MOUTH EVERYDAY AT BEDTIME, Disp: , Rfl:      SUMAtriptan Succinate (IMITREX PO), Take 50 mg by mouth as needed for migraine, Disp: , Rfl:      VITAMIN D, CHOLECALCIFEROL, PO, Take 15,000 Units by  mouth three times a week , Disp: , Rfl:      atorvastatin (LIPITOR) 10 MG tablet, , Disp: , Rfl:      methotrexate 2.5 MG tablet CHEMO, Take 4 tablets by mouth every 7 days. (Patient not taking: Reported on 4/3/2019), Disp: 24 tablet, Rfl: 4    Allergies:  Hydrocodone   Morphine     Past Medical History:  Psoriatic arthritis  Neck pain  Intestinal diverticular abscess   Colonic diverticular abscess   Diverticula of colon   Diverticulitis   Osteoarthritis of finger   Long term current use of non-steroidal anti-inflammatories (NSAID)   Chronic migraine without aura   Headache   Female pelvic pain     Past Surgical History:   section x3  Endometrial ablation 2007  Sinus surgery   Colectomy, partial 2015  Colostomy takedown 2015  Knee arthroscopy with meniscectomy, left 2017  Hysterectomy     Family History:    Brother- Psoriasis, hypertension  Father- Arthritis, heart disease, sleep apnea  Mother- Hypertension, psoriasis  Sister- Down's syndrome, heart disease  Maternal grandfather- heart disease  Maternal grandmother- Hemolytic anemia  Paternal grandmother- Diabetes    Social History:  The patient reports that she has never smoked. She has never used smokeless tobacco. She reports current alcohol use. She reports that she does not use drugs.   PCP: Aide Mccord  Marital Status:     Physical Exam:   /78   Pulse 81   Temp 98.5  F (36.9  C)   Wt 79.8 kg (176 lb)   SpO2 96%   BMI 32.19 kg/m     Wt Readings from Last 4 Encounters:   19 79.8 kg (176 lb)   19 80.1 kg (176 lb 9.4 oz)   10/31/18 78.5 kg (173 lb)   10/26/18 74.8 kg (165 lb)     Constitutional: Well-developed, appearing stated age; cooperative.  Eyes: Normal EOM, PERRLA, vision, conjunctiva, sclera.  ENT: Normal external ears, nose, hearing, lips, teeth, gums, throat. No mucous membrane lesions, normal saliva pool.  Neck: No mass or thyroid enlargement.  Resp: Lungs clear to auscultation, nl to  palpation.  CV: RRR, no murmurs, rubs or gallops, no edema.  GI: No ABD mass or tenderness, no HSM.  : Not tested.  Lymph: No cervical, supraclavicular, inguinal or epitrochlear nodes.  MS: The TMJ, neck, shoulder, elbow, wrist, spine, hip, and knee were examined and found normal. No active synovitis or altered joint anatomy. Full joint ROM. Normal  strength. No dactylitis, tenosynovitis, enthesopathy. Previously noted flexion contracture in the right 5th DIP, otherwise good alignment of digits. Normal flexion and extension of finger joints. Tenderness at the 1st CMC on the left. Pain with circumduction of the left thumb. Ankles are cool, there is no swelling or tenderness about the tibiotalar joints.   Skin: No nail pitting, alopecia, rash, nodules or lesions.  Neuro: Normal cranial nerves, strength, sensation, DTRs.   Psych: Normal judgement, orientation, memory, affect.     Laboratory:   RHEUM RESULTS Latest Ref Rng & Units 11/17/2017 6/27/2018 10/31/2018   CRP, INFLAMMATION 0.0 - 8.0 mg/L <2.9 <2.9 -   AST 0 - 45 U/L 33 22 20   ALT 0 - 50 U/L 40 32 36   ALBUMIN 3.5 - 5.0 g/dL - - -   WBC 4.0 - 11.0 10e9/L 6.5 5.9 6.5   RBC 3.8 - 5.2 10e12/L 4.45 4.64 4.41   HGB 11.7 - 15.7 g/dL 13.8 14.1 13.6   HCT 35.0 - 47.0 % 42.2 43.2 41.5   MCV 78 - 100 fl 95 93 94   MCHC 31.5 - 36.5 g/dL 32.7 32.6 32.8   RDW 10.0 - 15.0 % 12.4 12.0 12.2    - 450 10e9/L 216 212 227   CREATININE 0.52 - 1.04 mg/dL 0.65 0.66 0.69   GFR ESTIMATE, IF BLACK >60 mL/min/1.7m2 >90 >90 >90   GFR ESTIMATE >60 mL/min/1.7m2 >90 >90 89   QUANTIFERON TB GOLD neg Text - - -     QuantiFERON TB Gold   Date Value Ref Range Status   05/23/2017 neg neg Text Final     Scribe Disclosure:  I, Alfredo Mehta, am serving as a scribe to document services personally performed by Joe Rodriguez MD at this visit, based upon the provider's statements to me. All documentation has been reviewed by the aforementioned provider prior to being entered into  the official medical record.

## 2020-02-10 ENCOUNTER — TELEPHONE (OUTPATIENT)
Dept: RHEUMATOLOGY | Facility: CLINIC | Age: 55
End: 2020-02-10

## 2020-02-10 NOTE — TELEPHONE ENCOUNTER
PA Initiation    Medication: (Cosentyx)  Insurance Company: [employer] - Phone [phone] Fax [phone]  Pharmacy Filling the Rx: [facility] - [place], [place] - [address]  Filling Pharmacy Phone: [phone]  Filling Pharmacy Fax: [phone]  Start Date: 2/10/2020

## 2020-02-11 ENCOUNTER — TELEPHONE (OUTPATIENT)
Dept: RHEUMATOLOGY | Facility: CLINIC | Age: 55
End: 2020-02-11

## 2020-02-11 DIAGNOSIS — L40.50 PSORIATIC ARTHRITIS (H): ICD-10-CM

## 2020-02-11 NOTE — TELEPHONE ENCOUNTER
Cosentyx refill request routed to Dr. Rodriguez to review/sign.    Danita Valera MSN, RN  Rheumatology RN Care Coordinator   [facility]

## 2020-02-11 NOTE — TELEPHONE ENCOUNTER
Spoke with Ariana and she is actually taking COSENTYX 150mg every three weeks.     She is requesting a refill and will send the request to Dr. Rodriguez to review/sign.    Danita Valera MSN, RN  Rheumatology RN Care Coordinator  [facility]

## 2020-02-13 NOTE — TELEPHONE ENCOUNTER
Prior Authorization Approval    Authorization Effective Date: 2/11/2020  Authorization Expiration Date: 2/11/2021  Medication: (Cosentyx)  Approved Dose/Quantity: 2 pens every 28 days  Reference #: [id number]   Insurance Company: [employer] - Phone [phone] Fax [phone]  Expected CoPay:       CoPay Card Available:      Foundation Assistance Needed:    Which Pharmacy is filling the prescription (Not needed for infusion/clinic administered): [facility] - [facility] [address]  Pharmacy Notified: Yes  Patient Notified: Yes

## 2020-03-19 ENCOUNTER — DOCUMENTATION ONLY (OUTPATIENT)
Dept: CARE COORDINATION | Facility: CLINIC | Age: 55
End: 2020-03-19

## 2020-04-17 ASSESSMENT — PAIN SCALES - GENERAL: PAINLEVEL: MODERATE PAIN (4)

## 2020-04-17 NOTE — PROGRESS NOTES
"Veronica Sen is a 54 year old female who is being evaluated via a billable video visit.      The patient has been notified of following:     \"This video visit will be conducted via a call between you and your physician/provider. We have found that certain health care needs can be provided without the need for an in-person physical exam.  This service lets us provide the care you need with a video conversation.  If a prescription is necessary we can send it directly to your pharmacy.  If lab work is needed we can place an order for that and you can then stop by our lab to have the test done at a later time.    Video visits are billed at different rates depending on your insurance coverage.  Please reach out to your insurance provider with any questions.    If during the course of the call the physician/provider feels a video visit is not appropriate, you will not be charged for this service.\"    Patient has given verbal consent for Video visit? Yes    How would you like to obtain your AVS? [facility]    Patient would like the video invitation sent by: Send to e-mail at: [phone]  {If patient encounters technical issues they should call [phone] :[id number]}    Video Start Time: {video visit start time:152948}    Additional provider notes: {insert note template:532305} ***      Video-Visit Details    Type of service:  Video Visit    Video End Time (time video stopped): ***    Originating Location (pt. Location): {video visit patient location:052663::\"Home\"}    Distant Location (provider location):  [facility] RHEUMATOLOGY     Mode of Communication:  Video Conference via [employer]      {signature options:278330}      "

## 2020-05-01 ENCOUNTER — VIRTUAL VISIT (OUTPATIENT)
Dept: RHEUMATOLOGY | Facility: CLINIC | Age: 55
End: 2020-05-01
Attending: INTERNAL MEDICINE
Payer: COMMERCIAL

## 2020-05-01 ENCOUNTER — TELEPHONE (OUTPATIENT)
Dept: RHEUMATOLOGY | Facility: CLINIC | Age: 55
End: 2020-05-01

## 2020-05-01 DIAGNOSIS — L40.50 PSORIATIC ARTHRITIS (H): Primary | ICD-10-CM

## 2020-05-01 DIAGNOSIS — L40.50 PSORIATIC ARTHRITIS (H): ICD-10-CM

## 2020-05-01 RX ORDER — SECUKINUMAB 150 MG/ML
150 INJECTION SUBCUTANEOUS
Qty: 6 ML | Refills: 5 | Status: SHIPPED | OUTPATIENT
Start: 2020-05-01 | End: 2020-10-06

## 2020-05-01 NOTE — PROGRESS NOTES
"Veronica Sen is a 54 year old female who is being evaluated via a billable video visit.      The patient has been notified of following:     \"This video visit will be conducted via a call between you and your physician/provider. We have found that certain health care needs can be provided without the need for an in-person physical exam.  This service lets us provide the care you need with a video conversation.  If a prescription is necessary we can send it directly to your pharmacy.  If lab work is needed we can place an order for that and you can then stop by our lab to have the test done at a later time.    Video visits are billed at different rates depending on your insurance coverage.  Please reach out to your insurance provider with any questions.    If during the course of the call the physician/provider feels a video visit is not appropriate, you will not be charged for this service.\"    Patient has given verbal consent for Video visit? Yes    How would you like to obtain your AVS? [facility]    Patient would like the video invitation sent by: Send to e-mail at: [email or web address]    Will anyone else be joining your video visit? [facility]  Rheumatology Clinic  Joe Rodriguez MD  2020     Name: Veronica Sen  MRN: [id number]  Age: 53 year old  : 1965  Referring provider: Aide Mccord    Assessment and Plan:  # Psoriatic arthritis:  Patient relates persistent, significant relief from former small joint predominant arthralgia. Video exam shows no gross thickening in the joints of the hands and fingers. There is no skin disease. Blood work from 2020 showed normal CBC, CRP, electrolytes, and creatinine.  TSH was normal.    Psoriatic arthritis continues well-controlled on Cosentyx, started in mid-2018. I recommend continuing Cosentyx 150 mg at an injection frequency of every 3 weeks.  This frequency is selected because patient has experienced repeatedly recurrence " of small joint polyarthralgia and altered joint mobility when reducing injection frequency beyond 3 weeks.  Moreover, she reliably experiences injection associated migraine headaches.  Although the headaches are controllable with sumatriptan, she would prefer to experience as few of them as possible, and therefore dosing Cosentyx every 3 weeks as compared to every 2 weeks is preferable. While on Cosentyx I recommend every 4-6 moths measurement of creatinine, LFTs, and CBC. Return to clinic in 6 months. Plan summary:    1.  Continue Cosentyx 150 mg every 3 weeks.  2.  Use sumatriptan and and other symptomatic measures to control migraine headaches associated with dosing.  3.  Investigate whether alternative methods of Cosentyx administration might help with associated migraines--e.g. pre-filled syringes  4.  Go ahead and get Shingrix vaccination through primary care; no contraindication from the standpoint of Cosentyx administration or psoriatic arthritis.  5.  Liver function, creatinine, and CBC in approximately 2 months.     #OA, 1rst CMC: continue use of splints; IA injection may be repeated q 3-4 mos.    Follow-up: Return in about 6 months    HPI:  Veronica Sen is a 53 year old female with a history including psoriatic arthritis who presents for follow-up. I last saw the patient on 11/03/2019, at which time the patient reported much improved reduction of joint pain in hands and feet since changing her Cosentyx regimen in December 2018 from 300 mg q4wks to 150 mg q2wks. We planned to reduce cosentyx to every 3 weeks.    Interval history 5-1-20:    She is working from home. She has continued cosentyx during coronavius. IN 2-2020, she developed high fever, cough and was very sick for many days. She needed to use an inhaler for 3 weeks due to chest tightness. She noted increased joint pain when she reduced cosentyx to every 4 weeks. She has decent control of joint pain now.    She still get headaches after  injections of cosentyx. HA go away with sumatriptan,, but they typically recur for 2 to 3 days after each Cosentyx dose.    Knees and low back have been more painful recently, but nighttime pain is better. There is one scaly patch on the scalp that increases around the time of injection.  She has tried to frequency of Cosentyx dosing beyond every 3 weeks.  With each attempt, she has experienced recurrence of small joint predominant polyarthralgia and stiffness.  Her spouse reports to her that her overall mobility, activity level, and complaints of pain are much less since starting Cosentyx.    She wonders about Shingles vaccine safety.    Interval hx 11-19: Today, the patient reports that she has been pretty well. She did make a switch to atorvastatin this Spring and, after around one week, this made a large difference in abating her thigh/leg pain. She states that she still has a slight heaviness in the hands, but she denies any flare ups. She feels that dosing cycle-dependent symptoms have improved on Cosentyx, dosing every other week. She adds that she did have a period of cold-like illness in which she spaced out her Cosentyx doses by an additional week. She feels like she has been at a point for about 4-5 months where she has been open to reducing the frequency of Cosentyx injections. She states that she does not frequently take ibuprofen.    She states that she did have right CMC joint pain previously and she received a joint injection in this area which provided relief. She explains that her left CMC joint is now having a similar pain presentation.      Review of Systems:   Pertinent items are noted in HPI or as below, remainder of complete ROS is negative.      No recent problems with hearing or vision. No swallowing problems.   No breathing difficulty, shortness of breath, coughing, or wheezing.  No chest pain or palpitations.  No heart burn, indigestion, abdominal pain, nausea, vomiting, diarrhea.  No  urination problems, no bloody, cloudy urine, no dysuria.  No numbing, tingling, weakness.  No headaches or confusion.  No rashes. No easy bleeding or bruising.     Active Medications:   Current Outpatient Medications:      acetaminophen (TYLENOL) 325 MG tablet, , Disp: , Rfl:      Cetirizine HCl (ZYRTEC ALLERGY PO), Take 10 mg by mouth as needed, Disp: , Rfl:      ibuprofen (ADVIL/MOTRIN) 200 MG tablet, Take by mouth as needed, Disp: , Rfl:      OMEPRAZOLE PO, Take 20 mg by mouth every morning, Disp: , Rfl:      secukinumab (COSENTYX SENSOREADY PEN) 150 MG/ML Sensoready pen, Inject 2 mLs (300 mg) Subcutaneous every 28 days Hold for signs of infection, and then seek medical attention., Disp: 6 mL, Rfl: 5     simvastatin (ZOCOR) 20 MG tablet, TAKE 1 TABLET BY MOUTH EVERYDAY AT BEDTIME, Disp: , Rfl:      SUMAtriptan Succinate (IMITREX PO), Take 50 mg by mouth as needed for migraine, Disp: , Rfl:      VITAMIN D, CHOLECALCIFEROL, PO, Take 15,000 Units by mouth three times a week , Disp: , Rfl:      atorvastatin (LIPITOR) 10 MG tablet, , Disp: , Rfl:      methotrexate 2.5 MG tablet CHEMO, Take 4 tablets by mouth every 7 days. (Patient not taking: Reported on 4/3/2019), Disp: 24 tablet, Rfl: 4    Allergies:  Hydrocodone   Morphine     Past Medical History:  Psoriatic arthritis  Neck pain  Intestinal diverticular abscess   Colonic diverticular abscess   Diverticula of colon   Diverticulitis   Osteoarthritis of finger   Long term current use of non-steroidal anti-inflammatories (NSAID)   Chronic migraine without aura   Headache   Female pelvic pain     Past Surgical History:   section x3  Endometrial ablation 2007  Sinus surgery   Colectomy, partial 2015  Colostomy takedown 2015  Knee arthroscopy with meniscectomy, left 2017  Hysterectomy     Family History:    Brother- Psoriasis, hypertension  Father- Arthritis, heart disease, sleep apnea  Mother- Hypertension, psoriasis  Sister- Down's syndrome,  heart disease  Maternal grandfather- heart disease  Maternal grandmother- Hemolytic anemia  Paternal grandmother- Diabetes    Social History:  The patient reports that she has never smoked. She has never used smokeless tobacco. She reports current alcohol use. She reports that she does not use drugs.   PCP: Aide Mccord  Marital Status:     Physical Exam:   There were no vitals taken for this visit.   Wt Readings from Last 4 Encounters:   11/16/19 79.8 kg (176 lb)   04/03/19 80.1 kg (176 lb 9.4 oz)   10/31/18 78.5 kg (173 lb)   10/26/18 74.8 kg (165 lb)     Constitutional: Well-developed, appearing stated age; cooperative.  Eyes: Normal EOM, PERRLA, vision, conjunctiva, sclera.  ENT: Normal external ears, nose, hearing, lips, teeth, gums, throat  MS: NO visible swelling, hands and fingers.   Skin: No nail pitting, alopecia, rash, nodules or lesions.  Psych: Normal judgement, orientation, memory, affect.     Laboratory:   RHEUM RESULTS Latest Ref Rng & Units 11/17/2017 6/27/2018 10/31/2018   CRP, INFLAMMATION 0.0 - 8.0 mg/L <2.9 <2.9 -   AST 0 - 45 U/L 33 22 20   ALT 0 - 50 U/L 40 32 36   ALBUMIN 3.5 - 5.0 g/dL - - -   WBC 4.0 - 11.0 10e9/L 6.5 5.9 6.5   RBC 3.8 - 5.2 10e12/L 4.45 4.64 4.41   HGB 11.7 - 15.7 g/dL 13.8 14.1 13.6   HCT 35.0 - 47.0 % 42.2 43.2 41.5   MCV 78 - 100 fl 95 93 94   MCHC 31.5 - 36.5 g/dL 32.7 32.6 32.8   RDW 10.0 - 15.0 % 12.4 12.0 12.2    - 450 10e9/L 216 212 227   CREATININE 0.52 - 1.04 mg/dL 0.65 0.66 0.69   GFR ESTIMATE, IF BLACK >60 mL/min/1.7m2 >90 >90 >90   GFR ESTIMATE >60 mL/min/1.7m2 >90 >90 89   QUANTIFERON TB GOLD neg Text - - -     QuantiFERON TB Gold   Date Value Ref Range Status   05/23/2017 neg neg Text Final           Video-Visit Details    Type of service:  Video Visit    Video Start Time: 1:45 PM  Video End Time: 2:07 PM    Originating Location (pt. Location): Home    Distant Location (provider location):  [facility] RHEUMATOLOGY     Platform used for Video  Visit: Dipesh Rodriguez MD

## 2020-05-01 NOTE — PATIENT INSTRUCTIONS
Diagnosis:  1.  Psoriatic arthritis, controlled, but not in remission, on Cosentyx.  2.  Migraine headaches, worsened with Cosentyx therapy.    Plan:  1.  Continue Cosentyx 150 mg every 3 weeks.  2.  Use sumatriptan and and other symptomatic measures to control migraine headaches associated with dosing.  3.  Investigate whether alternative methods of Cosentyx administration might help with associated migraines.  4.  Go ahead and get Shingrix vaccination through primary care.  5.  Liver function, creatinine, and CBC in approximately 2 months.

## 2020-05-01 NOTE — TELEPHONE ENCOUNTER
Cosentyx pended and routed to Dr. Rodriguez to reivew/sign.    Danita Valera MSN, RN  Rheumatology RN Care Coordinator   [facility]

## 2020-05-01 NOTE — PROGRESS NOTES
Spoke with pt to set up video visit, pt requests that I call back around .  Nakia Mondragon CMA  5/1/2020 8:43 AM

## 2020-05-01 NOTE — TELEPHONE ENCOUNTER
Spoke to Ariana and she would like to try the prefilled syringes.  She is due to receive the Cosentyx on Tuesday and this shipment was cancelled and the  pharmacy will be waiting for the new script for the prefilled syringes.    I discussed Cosentyx with the patient today.  She experiencing headaches that are severe around the time of Cosentyx dosing.  This is occurred with multiple biologic medications and injection times.  Because I suspect the possibility of a vehicle or some other aspect of injections being the culprit, I recommend a trial of switching from the autoinjector to the prefilled syringe.  Please asked the patient if she would like to do this, and if so, let us orquidea up the prefilled syringe for the next injection cycle or refill of prescription.    Danita Valera MSN, RN  Rheumatology RN Care Coordinator   [facility]

## 2020-10-05 NOTE — PROGRESS NOTES
[facility]  Rheumatology Clinic--remote  Joe Rodriguez MD  10/05/2020     Name: Veronica Sen  MRN: [id number]  Age: 53 year old  : 1965  Referring provider: Aide Mccord    Assessment and Plan:  # Psoriatic arthritis:  Patient relates persistent, significant relief from former small joint predominant arthralgia. Video exam shows no gross thickening in the joints of the hands and fingers. There is no skin disease. Blood work from 2020 showed normal CBC, CRP, electrolytes, and creatinine.  TSH was normal.    Psoriatic arthritis continues well-controlled on Cosentyx, started in mid-2018. I recommend continuing Cosentyx 150 mg at an injection frequency of every 2 weeks.  This frequency is selected because patient has several times experienced recurrence of small joint polyarthralgia and altered joint mobility when reducing injection frequency beyond 14 days.  While on Cosentyx I recommend every 4-6 moths measurement of creatinine, LFTs, and CBC. Plan summary:    1.  Continue Cosentyx 150 mg every 2 weeks.  2.  Use sumatriptan and and other symptomatic measures to control migraine headaches associated with dosing.  3.  Continue alternative methods of Cosentyx administration with pre-filled syringes to prevent headaches  5.  Liver function, creatinine, and CBC in approximately 2 months.     #OA, 1rst CMC: stable, did not discuss today. Plan continue use of splints; IA injection may be repeated q 3-4 mos.    Follow-up: Return in about 6 months    HPI:  Veronica Sen is a 54 year old female with a history including psoriatic arthritis who presents for follow-up. I last saw the patient 2020, at which time the patient reported sustained reduction of joint pain in hands and feet on Cosentyx therapy. We planned to continue cosentyx 150 mg every 3 weeks.    Interval history 10-:    She tried using a new preparation of cosentyx every 3 weeks. Headaches are better. However, her  "hands became sore \"like books are sitting on them\" after just 2 weeks. There is no visible swelling in the hand joints. There is morning stiffness in the hands; back pain was worse but is better after starting physical therapy.  She has been using ibuprofen at night, 2 tabs.     No recent skin rash; the scaly skin patch is unchanged; it is itchy before dosing.    A smooth, small lump emerged from her R 3rd DIP; it is now resorbing. L 5th DIP locks, and is harder to straighten.    She has not had recurrence of the febrile illness that gave her joint pain and disorientation back in 2-2020.    Interval history 5-1-20:    She is working from home. She has continued cosentyx during coronavius. In 2-2020, she developed high fever, cough and was very sick for many days. She needed to use an inhaler for 3 weeks due to chest tightness. She noted increased joint pain when she reduced cosentyx to every 4 weeks. She has decent control of joint pain now.    She still get headaches after injections of cosentyx. HA go away with sumatriptan,, but they typically recur for 2 to 3 days after each Cosentyx dose.    Knees and low back have been more painful recently, but nighttime pain is better. There is one scaly patch on the scalp that increases around the time of injection.  She has tried to frequency of Cosentyx dosing beyond every 3 weeks.  With each attempt, she has experienced recurrence of small joint predominant polyarthralgia and stiffness.  Her spouse reports to her that her overall mobility, activity level, and complaints of pain are much less since starting Cosentyx.    She wonders about Shingles vaccine safety.    Interval hx 11-19: Today, the patient reports that she has been pretty well. She did make a switch to atorvastatin this Spring and, after around one week, this made a large difference in abating her thigh/leg pain. She states that she still has a slight heaviness in the hands, but she denies any flare ups. She " feels that dosing cycle-dependent symptoms have improved on Cosentyx, dosing every other week. She adds that she did have a period of cold-like illness in which she spaced out her Cosentyx doses by an additional week. She feels like she has been at a point for about 4-5 months where she has been open to reducing the frequency of Cosentyx injections. She states that she does not frequently take ibuprofen.    She states that she did have right CMC joint pain previously and she received a joint injection in this area which provided relief. She explains that her left CMC joint is now having a similar pain presentation.      Review of Systems:   Pertinent items are noted in HPI or as below, remainder of complete ROS is negative.      No recent problems with hearing or vision. No swallowing problems.   No breathing difficulty, shortness of breath, coughing, or wheezing.  No chest pain or palpitations.  No heart burn, indigestion, abdominal pain, nausea, vomiting, diarrhea.  No urination problems, no bloody, cloudy urine, no dysuria.  No numbing, tingling, weakness.  No headaches or confusion.  No rashes. No easy bleeding or bruising.     Active Medications:   Current Outpatient Medications:      acetaminophen (TYLENOL) 325 MG tablet, , Disp: , Rfl:      Cetirizine HCl (ZYRTEC ALLERGY PO), Take 10 mg by mouth as needed, Disp: , Rfl:      ibuprofen (ADVIL/MOTRIN) 200 MG tablet, Take by mouth as needed, Disp: , Rfl:      OMEPRAZOLE PO, Take 20 mg by mouth every morning, Disp: , Rfl:      secukinumab (COSENTYX SENSOREADY PEN) 150 MG/ML Sensoready pen, Inject 2 mLs (300 mg) Subcutaneous every 28 days Hold for signs of infection, and then seek medical attention., Disp: 6 mL, Rfl: 5     simvastatin (ZOCOR) 20 MG tablet, TAKE 1 TABLET BY MOUTH EVERYDAY AT BEDTIME, Disp: , Rfl:      SUMAtriptan Succinate (IMITREX PO), Take 50 mg by mouth as needed for migraine, Disp: , Rfl:      VITAMIN D, CHOLECALCIFEROL, PO, Take 15,000 Units by  mouth three times a week , Disp: , Rfl:      atorvastatin (LIPITOR) 10 MG tablet, , Disp: , Rfl:      methotrexate 2.5 MG tablet CHEMO, Take 4 tablets by mouth every 7 days. (Patient not taking: Reported on 4/3/2019), Disp: 24 tablet, Rfl: 4    Allergies:  Hydrocodone   Morphine     Past Medical History:  Psoriatic arthritis  Neck pain  Intestinal diverticular abscess   Colonic diverticular abscess   Diverticula of colon   Diverticulitis   Osteoarthritis of finger   Long term current use of non-steroidal anti-inflammatories (NSAID)   Chronic migraine without aura   Headache   Female pelvic pain     Past Surgical History:   section x3  Endometrial ablation 2007  Sinus surgery   Colectomy, partial 2015  Colostomy takedown 2015  Knee arthroscopy with meniscectomy, left 2017  Hysterectomy     Family History:    Brother- Psoriasis, hypertension  Father- Arthritis, heart disease, sleep apnea  Mother- Hypertension, psoriasis  Sister- Down's syndrome, heart disease  Maternal grandfather- heart disease  Maternal grandmother- Hemolytic anemia  Paternal grandmother- Diabetes    Social History:  The patient reports that she has never smoked. She has never used smokeless tobacco. She reports current alcohol use. She reports that she does not use drugs.   PCP: Aide Mccord  Marital Status:     Physical Exam:   There were no vitals taken for this visit.   Wt Readings from Last 4 Encounters:   19 79.8 kg (176 lb)   19 80.1 kg (176 lb 9.4 oz)   10/31/18 78.5 kg (173 lb)   10/26/18 74.8 kg (165 lb)     Constitutional: Well-developed, appearing stated age; cooperative.  Eyes: Normal EOM, PERRLA, vision, conjunctiva, sclera.  ENT: Normal external ears, nose, hearing, lips, teeth, gums, throat  MS: NO visible swelling, hands and fingers.   Skin: No nail pitting, alopecia, rash, nodules or lesions.  Psych: Normal judgement, orientation, memory, affect.     Laboratory:   RHEUM RESULTS Latest  "Ref Rng & Units 11/17/2017 6/27/2018 10/31/2018   CRP, INFLAMMATION 0.0 - 8.0 mg/L <2.9 <2.9 -   AST 0 - 45 U/L 33 22 20   ALT 0 - 50 U/L 40 32 36   ALBUMIN 3.5 - 5.0 g/dL - - -   WBC 4.0 - 11.0 10e9/L 6.5 5.9 6.5   RBC 3.8 - 5.2 10e12/L 4.45 4.64 4.41   HGB 11.7 - 15.7 g/dL 13.8 14.1 13.6   HCT 35.0 - 47.0 % 42.2 43.2 41.5   MCV 78 - 100 fl 95 93 94   MCHC 31.5 - 36.5 g/dL 32.7 32.6 32.8   RDW 10.0 - 15.0 % 12.4 12.0 12.2    - 450 10e9/L 216 212 227   CREATININE 0.52 - 1.04 mg/dL 0.65 0.66 0.69   GFR ESTIMATE, IF BLACK >60 mL/min/1.7m2 >90 >90 >90   GFR ESTIMATE >60 mL/min/1.7m2 >90 >90 89   QUANTIFERON TB GOLD neg Text - - -     QuantiFERON TB Gold   Date Value Ref Range Status   05/23/2017 neg neg Text Final       Veronica Sen is a 54 year old female who is being evaluated via a billable video visit.      The patient has been notified of following:     \"This video visit will be conducted via a call between you and your physician/provider. We have found that certain health care needs can be provided without the need for an in-person physical exam.  This service lets us provide the care you need with a video conversation.  If a prescription is necessary we can send it directly to your pharmacy.  If lab work is needed we can place an order for that and you can then stop by our lab to have the test done at a later time.    Video visits are billed at different rates depending on your insurance coverage.  Please reach out to your insurance provider with any questions.    If during the course of the call the physician/provider feels a video visit is not appropriate, you will not be charged for this service.\"    Patient has given verbal consent for Video visit? Yes  How would you like to obtain your AVS? MyChart    Will anyone else be joining your video visit? No        Video-Visit Details    Type of service:  Video Visit    Video Start Time: 2:01 PM  Video End Time: 2:25 PM    Originating Location (pt. " Location): Home    Distant Location (provider location):  [facility] [place]     Platform used for Video Visit: Dipesh Rodriguez MD

## 2020-10-06 ENCOUNTER — VIRTUAL VISIT (OUTPATIENT)
Dept: RHEUMATOLOGY | Facility: CLINIC | Age: 55
End: 2020-10-06
Attending: INTERNAL MEDICINE
Payer: COMMERCIAL

## 2020-10-06 DIAGNOSIS — L40.50 PSORIATIC ARTHRITIS (H): ICD-10-CM

## 2020-10-06 PROCEDURE — 99214 OFFICE O/P EST MOD 30 MIN: CPT | Mod: GT | Performed by: INTERNAL MEDICINE

## 2020-10-06 RX ORDER — SECUKINUMAB 150 MG/ML
150 INJECTION SUBCUTANEOUS
Qty: 6 ML | Refills: 5 | Status: SHIPPED | OUTPATIENT
Start: 2020-10-06 | End: 2021-10-07

## 2020-10-06 SDOH — HEALTH STABILITY: MENTAL HEALTH: HOW MANY STANDARD DRINKS CONTAINING ALCOHOL DO YOU HAVE ON A TYPICAL DAY?: NOT ASKED

## 2020-10-06 SDOH — HEALTH STABILITY: MENTAL HEALTH: HOW OFTEN DO YOU HAVE A DRINK CONTAINING ALCOHOL?: MONTHLY OR LESS

## 2020-10-06 SDOH — HEALTH STABILITY: MENTAL HEALTH: HOW OFTEN DO YOU HAVE 6 OR MORE DRINKS ON ONE OCCASION?: NOT ASKED

## 2020-10-06 ASSESSMENT — PAIN SCALES - GENERAL: PAINLEVEL: MILD PAIN (3)

## 2020-10-06 NOTE — PATIENT INSTRUCTIONS
Diagnosis:  1.  Psoriatic arthritis: Improved headaches on Cosentyx given by prefilled syringe.  However, symptoms have recurred after just 14 days with the last several doses of Cosentyx.  I recommend resuming prior every other week frequency of injection.    Plan:  1.  Repeat blood work for liver function, blood counts, and kidney function in the next several months.  2.  Resume Cosentyx 150 mg every other week.

## 2020-10-06 NOTE — LETTER
10/6/2020       RE: Veronica Sen  [id number] [address] [address]     Dear Colleague,    Thank you for referring your patient, Veronica Sen, to the [facility] [place] at [facility]. Please see a copy of my visit note below.        [facility]  Rheumatology Clinic--remote  Joe Rodriguez MD  10/05/2020     Name: Veroinca Sen  MRN: [id number]  Age: 53 year old  : 1965  Referring provider: Aide Mccord    Assessment and Plan:  # Psoriatic arthritis:  Patient relates persistent, significant relief from former small joint predominant arthralgia. Video exam shows no gross thickening in the joints of the hands and fingers. There is no skin disease. Blood work from 2020 showed normal CBC, CRP, electrolytes, and creatinine.  TSH was normal.    Psoriatic arthritis continues well-controlled on Cosentyx, started in mid-2018. I recommend continuing Cosentyx 150 mg at an injection frequency of every 2 weeks.  This frequency is selected because patient has several times experienced recurrence of small joint polyarthralgia and altered joint mobility when reducing injection frequency beyond 14 days.  While on Cosentyx I recommend every 4-6 moths measurement of creatinine, LFTs, and CBC. Plan summary:    1.  Continue Cosentyx 150 mg every 2 weeks.  2.  Use sumatriptan and and other symptomatic measures to control migraine headaches associated with dosing.  3.  Continue alternative methods of Cosentyx administration with pre-filled syringes to prevent headaches  5.  Liver function, creatinine, and CBC in approximately 2 months.     #OA, 1rst CMC: stable, did not discuss today. Plan continue use of splints; IA injection may be repeated q 3-4 mos.    Follow-up: Return in about 6 months    HPI:  Veronica Sen is a 54 year old female with a history including psoriatic arthritis who presents for follow-up. I last saw the  "patient 5-1-2020, at which time the patient reported sustained reduction of joint pain in hands and feet on Cosentyx therapy. We planned to continue cosentyx 150 mg every 3 weeks.    Interval history 10-:    She tried using a new preparation of cosentyx every 3 weeks. Headaches are better. However, her hands became sore \"like books are sitting on them\" after just 2 weeks. There is no visible swelling in the hand joints. There is morning stiffness in the hands; back pain was worse but is better after starting physical therapy.  She has been using ibuprofen at night, 2 tabs.     No recent skin rash; the scaly skin patch is unchanged; it is itchy before dosing.    A smooth, small lump emerged from her R 3rd DIP; it is now resorbing. L 5th DIP locks, and is harder to straighten.    She has not had recurrence of the febrile illness that gave her joint pain and disorientation back in 2-2020.    Interval history 5-1-20:    She is working from home. She has continued cosentyx during coronavius. In 2-2020, she developed high fever, cough and was very sick for many days. She needed to use an inhaler for 3 weeks due to chest tightness. She noted increased joint pain when she reduced cosentyx to every 4 weeks. She has decent control of joint pain now.    She still get headaches after injections of cosentyx. HA go away with sumatriptan,, but they typically recur for 2 to 3 days after each Cosentyx dose.    Knees and low back have been more painful recently, but nighttime pain is better. There is one scaly patch on the scalp that increases around the time of injection.  She has tried to frequency of Cosentyx dosing beyond every 3 weeks.  With each attempt, she has experienced recurrence of small joint predominant polyarthralgia and stiffness.  Her spouse reports to her that her overall mobility, activity level, and complaints of pain are much less since starting Cosentyx.    She wonders about Shingles vaccine " safety.    Interval hx 11-19: Today, the patient reports that she has been pretty well. She did make a switch to atorvastatin this Spring and, after around one week, this made a large difference in abating her thigh/leg pain. She states that she still has a slight heaviness in the hands, but she denies any flare ups. She feels that dosing cycle-dependent symptoms have improved on Cosentyx, dosing every other week. She adds that she did have a period of cold-like illness in which she spaced out her Cosentyx doses by an additional week. She feels like she has been at a point for about 4-5 months where she has been open to reducing the frequency of Cosentyx injections. She states that she does not frequently take ibuprofen.    She states that she did have right CMC joint pain previously and she received a joint injection in this area which provided relief. She explains that her left CMC joint is now having a similar pain presentation.      Review of Systems:   Pertinent items are noted in HPI or as below, remainder of complete ROS is negative.      No recent problems with hearing or vision. No swallowing problems.   No breathing difficulty, shortness of breath, coughing, or wheezing.  No chest pain or palpitations.  No heart burn, indigestion, abdominal pain, nausea, vomiting, diarrhea.  No urination problems, no bloody, cloudy urine, no dysuria.  No numbing, tingling, weakness.  No headaches or confusion.  No rashes. No easy bleeding or bruising.     Active Medications:   Current Outpatient Medications:      acetaminophen (TYLENOL) 325 MG tablet, , Disp: , Rfl:      Cetirizine HCl (ZYRTEC ALLERGY PO), Take 10 mg by mouth as needed, Disp: , Rfl:      ibuprofen (ADVIL/MOTRIN) 200 MG tablet, Take by mouth as needed, Disp: , Rfl:      OMEPRAZOLE PO, Take 20 mg by mouth every morning, Disp: , Rfl:      secukinumab (COSENTYX SENSOREADY PEN) 150 MG/ML Sensoready pen, Inject 2 mLs (300 mg) Subcutaneous every 28 days Hold for  signs of infection, and then seek medical attention., Disp: 6 mL, Rfl: 5     simvastatin (ZOCOR) 20 MG tablet, TAKE 1 TABLET BY MOUTH EVERYDAY AT BEDTIME, Disp: , Rfl:      SUMAtriptan Succinate (IMITREX PO), Take 50 mg by mouth as needed for migraine, Disp: , Rfl:      VITAMIN D, CHOLECALCIFEROL, PO, Take 15,000 Units by mouth three times a week , Disp: , Rfl:      atorvastatin (LIPITOR) 10 MG tablet, , Disp: , Rfl:      methotrexate 2.5 MG tablet CHEMO, Take 4 tablets by mouth every 7 days. (Patient not taking: Reported on 4/3/2019), Disp: 24 tablet, Rfl: 4    Allergies:  Hydrocodone   Morphine     Past Medical History:  Psoriatic arthritis  Neck pain  Intestinal diverticular abscess   Colonic diverticular abscess   Diverticula of colon   Diverticulitis   Osteoarthritis of finger   Long term current use of non-steroidal anti-inflammatories (NSAID)   Chronic migraine without aura   Headache   Female pelvic pain     Past Surgical History:   section x3  Endometrial ablation 2007  Sinus surgery   Colectomy, partial 2015  Colostomy takedown 2015  Knee arthroscopy with meniscectomy, left 2017  Hysterectomy     Family History:    Brother- Psoriasis, hypertension  Father- Arthritis, heart disease, sleep apnea  Mother- Hypertension, psoriasis  Sister- Down's syndrome, heart disease  Maternal grandfather- heart disease  Maternal grandmother- Hemolytic anemia  Paternal grandmother- Diabetes    Social History:  The patient reports that she has never smoked. She has never used smokeless tobacco. She reports current alcohol use. She reports that she does not use drugs.   PCP: Aide Mccord  Marital Status:     Physical Exam:   There were no vitals taken for this visit.   Wt Readings from Last 4 Encounters:   19 79.8 kg (176 lb)   19 80.1 kg (176 lb 9.4 oz)   10/31/18 78.5 kg (173 lb)   10/26/18 74.8 kg (165 lb)     Constitutional: Well-developed, appearing stated age;  "cooperative.  Eyes: Normal EOM, PERRLA, vision, conjunctiva, sclera.  ENT: Normal external ears, nose, hearing, lips, teeth, gums, throat  MS: NO visible swelling, hands and fingers.   Skin: No nail pitting, alopecia, rash, nodules or lesions.  Psych: Normal judgement, orientation, memory, affect.     Laboratory:   RHEUM RESULTS Latest Ref Rng & Units 11/17/2017 6/27/2018 10/31/2018   CRP, INFLAMMATION 0.0 - 8.0 mg/L <2.9 <2.9 -   AST 0 - 45 U/L 33 22 20   ALT 0 - 50 U/L 40 32 36   ALBUMIN 3.5 - 5.0 g/dL - - -   WBC 4.0 - 11.0 10e9/L 6.5 5.9 6.5   RBC 3.8 - 5.2 10e12/L 4.45 4.64 4.41   HGB 11.7 - 15.7 g/dL 13.8 14.1 13.6   HCT 35.0 - 47.0 % 42.2 43.2 41.5   MCV 78 - 100 fl 95 93 94   MCHC 31.5 - 36.5 g/dL 32.7 32.6 32.8   RDW 10.0 - 15.0 % 12.4 12.0 12.2    - 450 10e9/L 216 212 227   CREATININE 0.52 - 1.04 mg/dL 0.65 0.66 0.69   GFR ESTIMATE, IF BLACK >60 mL/min/1.7m2 >90 >90 >90   GFR ESTIMATE >60 mL/min/1.7m2 >90 >90 89   QUANTIFERON TB GOLD neg Text - - -     QuantiFERON TB Gold   Date Value Ref Range Status   05/23/2017 neg neg Text Final       Veronica Sen is a 54 year old female who is being evaluated via a billable video visit.      The patient has been notified of following:     \"This video visit will be conducted via a call between you and your physician/provider. We have found that certain health care needs can be provided without the need for an in-person physical exam.  This service lets us provide the care you need with a video conversation.  If a prescription is necessary we can send it directly to your pharmacy.  If lab work is needed we can place an order for that and you can then stop by our lab to have the test done at a later time.    Video visits are billed at different rates depending on your insurance coverage.  Please reach out to your insurance provider with any questions.    If during the course of the call the physician/provider feels a video visit is not appropriate, you will " "not be charged for this service.\"    Patient has given verbal consent for Video visit? Yes  How would you like to obtain your AVS? MyChart    Will anyone else be joining your video visit? No      Video-Visit Details    Type of service:  Video Visit    Video Start Time: 2:01 PM  Video End Time: 2:25 PM    Originating Location (pt. Location): Home    Distant Location (provider location):  [facility] [place]     Platform used for Video Visit: Dipesh Rodriguez MD              "

## 2020-11-14 ENCOUNTER — HEALTH MAINTENANCE LETTER (OUTPATIENT)
Age: 55
End: 2020-11-14

## 2021-09-12 ENCOUNTER — HEALTH MAINTENANCE LETTER (OUTPATIENT)
Age: 56
End: 2021-09-12

## 2021-10-06 NOTE — PROGRESS NOTES
[facility]  Rheumatology Clinic  Joe Rodriguez MD  10/07/2021     Name: Veronica Sen  MRN: [id number]  Age: 55 year old  : 1965  Referring provider: Aide Mccord    Assessment and Plan:  # Psoriatic arthritis:  Patient relates persistent, significant relief from former small joint predominant arthralgia, but subacute worsening of joint pain. Exam shows no gross thickening in the joints of the hands and fingers; there is tenderness at MCP and L ankle. There is no skin disease. Last blood work from 2020 showed normal CBC, CRP, electrolytes, and creatinine.  TSH was normal.    Psoriatic arthritis continues ;overall well-controlled on Cosentyx, started in mid-, but is flaring today. I recommend continuing Cosentyx 150 mg at an injection frequency of every 2 weeks.  This frequency is selected because patient has several times experienced recurrence of small joint polyarthralgia and altered joint mobility when reducing injection frequency beyond 14 days.  While on Cosentyx I recommend every 4-6 moths measurement of creatinine, LFTs, and CBC.    Plan:  1.  Repeat blood work for liver function, blood counts, and kidney function in the next several months.  2.  Continue Cosentyx 150 mg every other week.  3. Prednisone 20 mg daily X 7, then 15 mg daily X 7.  4. Bloodwork for liver, kidney function, CBC in     #OA, 1rst CMC: stable, did not discuss today. Plan continue use of splints; IA injection may be repeated q 3-4 mos.    Follow-up: Return in about 6 months    Joe Rodriguez MD  Staff Rheumatologist, [facility]      HPI:  Veronica Sen has a history including psoriatic arthritis who presents for follow-up. I last saw the patient , at which time the patient reported sustained reduction of joint pain in hands and feet on Cosentyx therapy. We planned to continue cosentyx 150 mg every 3 weeks.    Interval history 10-7-2021:    In the last week, she had swelling/pain in  "the R 3rd MCP, both knees, and L ankle. Pain in L ankle is stinging; knee pain is achy; hard to get back up from floor. Walking downstairs has been painful for a month. She has been using ibuprofen. Pain is equal in both knees.    Before that, she had stable low grade pain in many joints.  Cosentyx 150 mg every 2 weeks was associated with improvement. She has been using sensoready pens.    She has not had COVID booster.    Interval history 10-:    She tried using a new preparation of cosentyx every 3 weeks. Headaches are better. However, her hands became sore \"like books are sitting on them\" after just 2 weeks. There is no visible swelling in the hand joints. There is morning stiffness in the hands; back pain was worse but is better after starting physical therapy.  She has been using ibuprofen at night, 2 tabs.     No recent skin rash; the scaly skin patch is unchanged; it is itchy before dosing.    A smooth, small lump emerged from her R 3rd DIP; it is now resorbing. L 5th DIP locks, and is harder to straighten.    She has not had recurrence of the febrile illness that gave her joint pain and disorientation back in 2-2020.       Review of Systems:   Pertinent items are noted in HPI or as below, remainder of complete ROS is negative.      No recent problems with hearing or vision. No swallowing problems.   No breathing difficulty, shortness of breath, coughing, or wheezing.  No chest pain or palpitations.  No heart burn, indigestion, abdominal pain, nausea, vomiting, diarrhea.  No urination problems, no bloody, cloudy urine, no dysuria.  No numbing, tingling, weakness.  No headaches or confusion.  No rashes. No easy bleeding or bruising.     Current Outpatient Medications   Medication Sig     acetaminophen (TYLENOL) 325 MG tablet      Cetirizine HCl (ZYRTEC ALLERGY PO) Take 10 mg by mouth as needed     ibuprofen (ADVIL/MOTRIN) 200 MG tablet Take by mouth as needed     OMEPRAZOLE PO Take 20 mg by mouth every " "morning     secukinumab (COSENTYX SENSOREADY PEN) 150 MG/ML Sensoready pen Inject 1 mL (150 mg) Subcutaneous every 14 days Hold for signs of infection, and then seek medical attention.     simvastatin (ZOCOR) 20 MG tablet TAKE 1 TABLET BY MOUTH EVERYDAY AT BEDTIME     SUMAtriptan Succinate (IMITREX PO) Take 50 mg by mouth as needed for migraine     VITAMIN D, CHOLECALCIFEROL, PO Take 15,000 Units by mouth three times a week      No current facility-administered medications for this visit.         Allergies:  Hydrocodone   Morphine     Past Medical History:  Psoriatic arthritis  Neck pain  Intestinal diverticular abscess   Colonic diverticular abscess   Diverticula of colon   Diverticulitis   Osteoarthritis of finger   Long term current use of non-steroidal anti-inflammatories (NSAID)   Chronic migraine without aura   Headache   Female pelvic pain     Past Surgical History:   section x3  Endometrial ablation   Sinus surgery   Colectomy, partial 2015  Colostomy takedown 2015  Knee arthroscopy with meniscectomy, left 2017  Hysterectomy     Family History:    Brother- Psoriasis, hypertension  Father- Arthritis, heart disease, sleep apnea  Mother- Hypertension, psoriasis  Sister- Down's syndrome, heart disease  Maternal grandfather- heart disease  Maternal grandmother- Hemolytic anemia  Paternal grandmother- Diabetes    Social History:  The patient reports that she has never smoked. She has never used smokeless tobacco. She reports current alcohol use. She reports that she does not use drugs.   PCP: Aide Mccord  Marital Status:     Physical Exam:   /80   Pulse 86   Temp 98.4  F (36.9  C) (Oral)   Ht 1.575 m (5' 2\")   Wt 79.6 kg (175 lb 8 oz)   SpO2 98%   BMI 32.10 kg/m     Wt Readings from Last 4 Encounters:   10/07/21 79.6 kg (175 lb 8 oz)   19 79.8 kg (176 lb)   19 80.1 kg (176 lb 9.4 oz)   10/31/18 78.5 kg (173 lb)     Constitutional: Well-developed, " appearing stated age; cooperative.  Eyes: Normal EOM, PERRLA, vision, conjunctiva, sclera.  ENT: Normal external ears, nose, hearing, lips, teeth, gums, throat  MS: NO visible swelling, hands and fingers. Tender MCP and knee joint lines without gross synovial thickening.  Skin: No nail pitting, alopecia, rash, nodules or lesions.  Psych: Normal judgement, orientation, memory, affect.     Laboratory:   RHEUM RESULTS Latest Ref Rng & Units 8/28/2015 12/2/2015 7/13/2016   ALBUMIN 3.5 - 5.0 g/dL - - 4.3   ALT 0 - 50 U/L 58(H) 52(H) 29   AST 0 - 45 U/L 26 28 18   CREATININE 0.52 - 1.04 mg/dL 0.65 - 0.65   CRP 0.0 - 8.0 mg/L - 7.1 -   GFR ESTIMATE, IF BLACK >60 mL/min/1.7m2 >90  African American GFR Calc   - >90   GFR Calc     GFR ESTIMATE >60 mL/min/1.7m2 >90  Non African American GFR Calc   - >90  Non  GFR Calc     HEMATOCRIT 35.0 - 47.0 % 40.2 39.7 40.4   HEMOGLOBIN 11.7 - 15.7 g/dL 13.5 13.3 13.6   WBC 4.0 - 11.0 10e9/L 5.3 8.2 5.8   RBC 3.8 - 5.2 10e12/L 4.50 4.22 4.34   RDW 10.0 - 15.0 % 13.1 13.7 12.0   MCHC 31.5 - 36.5 g/dL 33.6 33.5 33.7   MCV 78 - 100 fl 89 94 93   PLATELET COUNT 150 - 450 10e9/L 254 283 211   QUANTIFERON TB GOLD neg Text - - -     QuantiFERON TB Gold   Date Value Ref Range Status   05/23/2017 neg neg Text Final

## 2021-10-07 ENCOUNTER — OFFICE VISIT (OUTPATIENT)
Dept: RHEUMATOLOGY | Facility: CLINIC | Age: 56
End: 2021-10-07
Payer: COMMERCIAL

## 2021-10-07 VITALS
HEART RATE: 86 BPM | SYSTOLIC BLOOD PRESSURE: 122 MMHG | OXYGEN SATURATION: 98 % | HEIGHT: 62 IN | DIASTOLIC BLOOD PRESSURE: 80 MMHG | WEIGHT: 175.5 LBS | BODY MASS INDEX: 32.3 KG/M2 | TEMPERATURE: 98.4 F

## 2021-10-07 DIAGNOSIS — L40.50 PSORIATIC ARTHRITIS (H): Primary | ICD-10-CM

## 2021-10-07 PROCEDURE — 99214 OFFICE O/P EST MOD 30 MIN: CPT | Performed by: INTERNAL MEDICINE

## 2021-10-07 RX ORDER — PREDNISONE 5 MG/1
TABLET ORAL
Qty: 50 TABLET | Refills: 1 | Status: SHIPPED | OUTPATIENT
Start: 2021-10-07 | End: 2022-04-07

## 2021-10-07 RX ORDER — SECUKINUMAB 150 MG/ML
150 INJECTION SUBCUTANEOUS
Qty: 6 ML | Refills: 5 | Status: SHIPPED | OUTPATIENT
Start: 2021-10-07 | End: 2022-04-07

## 2021-10-07 ASSESSMENT — PAIN SCALES - GENERAL: PAINLEVEL: MILD PAIN (3)

## 2021-10-07 ASSESSMENT — MIFFLIN-ST. JEOR: SCORE: 1334.31

## 2021-10-07 NOTE — PATIENT INSTRUCTIONS
Diagnosis:  1.  Psoriatic arthritis: Flaring pain in hand, ankle, knee reflects inflammation. Plan prednisone burst/taper.    Plan:  1.  Repeat blood work for liver function, blood counts, and kidney function in the next several months.  2.  Continue Cosentyx 150 mg every other week.  3. Prednisone 20 mg daily X 7, then 15 mg daily X 7.  4. Bloodwork for liver, kidney function, CBC in 2-2022

## 2021-10-07 NOTE — NURSING NOTE
"Chief Complaint   Patient presents with     Follow Up     Psoriatic arthritis:       Vitals:    10/07/21 1436   BP: 122/80   Pulse: 86   Temp: 98.4  F (36.9  C)   TempSrc: Oral   SpO2: 98%   Weight: 79.6 kg (175 lb 8 oz)   Height: 1.575 m (5' 2\")       Body mass index is 32.1 kg/m .      Pat Troy MA    "

## 2022-01-02 ENCOUNTER — HEALTH MAINTENANCE LETTER (OUTPATIENT)
Age: 57
End: 2022-01-02

## 2022-01-25 ENCOUNTER — MYC MEDICAL ADVICE (OUTPATIENT)
Dept: RHEUMATOLOGY | Facility: CLINIC | Age: 57
End: 2022-01-25
Payer: COMMERCIAL

## 2022-01-27 NOTE — TELEPHONE ENCOUNTER
Sorry to learn about the COVID infection; I am glad that you describe associated symptoms in the past tense.    No, I am not aware of convincing research evidence that cosentyx use would have heightened your risk of infection. If you are recovered from COVID, please resume/continue cosentyx

## 2022-01-28 NOTE — TELEPHONE ENCOUNTER
Pt notified and will resume Cosentyx . Held the last one will resume on Sunday .Chiquis Jordan RN

## 2022-04-04 NOTE — PROGRESS NOTES
[facility]  Rheumatology Clinic  Joe Rodriguez MD  2022     Name: Veronica Sen  MRN: [id number]  Age: 56 year old  : 1965  Referring provider: Aide Mccord    Assessment and Plan:    # Psoriatic arthritis:  Patient relates persistent, significant relief from former small joint predominant arthralgia. She had flare of inflammatory joint pain in hand, ankle, and knee back in . Flare has resolved and exam shows no gross thickening in the joints of the hands and fingers; there is mild  tenderness at right 2nd through 3rd DIPs joints and mild tenderness at left achilles tendon insertion. There is no skin disease. Last blood work from 2022 showed normal CBC, CRP, electrolytes, and creatinine.    Psoriatic arthritis continues overall well-controlled on Cosentyx, started in mid-2018. I recommend continuing Cosentyx 150 mg at an injection frequency of every 2 weeks.  This frequency is selected because patient has several times experienced recurrence of small joint polyarthralgia and altered joint mobility when reducing injection frequency beyond 14 days. Plan continue Cosentyx until 12 months of minimal/no symptoms. If using NSAIDs regularly,  I recommend every 6-12 mo measurement of creatinine, LFTs, and CBC.    # OA, 1rst CMC: stable, did not discuss today. Plan continue use of splints; IA injection may be repeated q 3-4 mos.    #  Exercise induced L midfoot pain: this is unlikely due to inflammatory arthropathy. Agree with planned Podiatry referral.    Plan:  1.  Continue Cosentyx 150 mg every other week.  2. Shingrix vaccination is safe.      Follow-up: Return in about 6 months    Discussed and staffed with MD Rob Jean Baptiste,   Rheumatology fellow     I saw this patient with the Rheumatology Fellow. I agree with the findings and recommendations.    Joe Rodriguez M.D.  Staff Rheumatologist, [facility]  Pager [phone]        HPI:  Veronicamelvin Ríos Farooqoliverio  has a history including psoriatic arthritis who presents for follow-up. I last saw the patient , at which time the patient reported sustained reduction of joint pain in hands and feet on Cosentyx therapy. We planned to continue cosentyx 150 mg every 2 weeks.    Interval history April 7, 2022    She has been feeling well overall. She hasn't had any hand joint pain since her last visit in October. Reports pain in her left foot is limiting her activity. She denies pain in any other joints at this time. She hasn't required ibuprofen for pain control recently. She denies joint stiffness, back pain, fever or chills.    She is on Cosentyx 150 mg q 2 weeks, tolerating well without any side effects. She hasn't had any headaches since adjustment in Cosyntex dosage.     She had COVID with mild flu-like symptoms in 1-2022.    Interval history 10-7-2021:    In the last week, she had swelling/pain in the R 3rd MCP, both knees, and L ankle. Pain in L ankle is stinging; knee pain is achy; hard to get back up from floor. Walking downstairs has been painful for a month. She has been using ibuprofen. Pain is equal in both knees.    Before that, she had stable low grade pain in many joints.  Cosentyx 150 mg every 2 weeks was associated with improvement. She has been using sensoready pens.    She has not had COVID booster.     Review of Systems:   Pertinent items are noted in HPI or as below, remainder of complete ROS is negative.      No recent problems with hearing or vision. No swallowing problems.   No breathing difficulty, shortness of breath, coughing, or wheezing.  No chest pain or palpitations.  No heart burn, indigestion, abdominal pain, nausea, vomiting, diarrhea.  No urination problems, no bloody, cloudy urine, no dysuria.  No numbing, tingling, weakness.  No headaches or confusion.  No rashes. No easy bleeding or bruising.     Current Outpatient Medications   Medication Sig     acetaminophen (TYLENOL) 325 MG tablet       Cetirizine HCl (ZYRTEC ALLERGY PO) Take 10 mg by mouth as needed     ibuprofen (ADVIL/MOTRIN) 200 MG tablet Take by mouth as needed     OMEPRAZOLE PO Take 20 mg by mouth every morning     propranolol SR BEADS (INDREAL XL) 80 MG 24 hr capsule Take 80 mg by mouth     secukinumab (COSENTYX SENSOREADY PEN) 150 MG/ML Sensoready pen Inject 1 mL (150 mg) Subcutaneous every 14 days Hold for signs of infection, and then seek medical attention.     simvastatin (ZOCOR) 20 MG tablet TAKE 1 TABLET BY MOUTH EVERYDAY AT BEDTIME     SUMAtriptan Succinate (IMITREX PO) Take 50 mg by mouth as needed for migraine     VITAMIN D, CHOLECALCIFEROL, PO Take 15,000 Units by mouth three times a week      No current facility-administered medications for this visit.         Allergies:  Hydrocodone   Morphine     Past Medical History:  Psoriatic arthritis  Neck pain  Intestinal diverticular abscess   Colonic diverticular abscess   Diverticula of colon   Diverticulitis   Osteoarthritis of finger   Long term current use of non-steroidal anti-inflammatories (NSAID)   Chronic migraine without aura   Headache   Female pelvic pain     Past Surgical History:   section x3  Endometrial ablation 2007  Sinus surgery   Colectomy, partial 2015  Colostomy takedown 2015  Knee arthroscopy with meniscectomy, left 2017  Hysterectomy     Family History:    Brother- Psoriasis, hypertension  Father- Arthritis, heart disease, sleep apnea  Mother- Hypertension, psoriasis  Sister- Down's syndrome, heart disease  Maternal grandfather- heart disease  Maternal grandmother- Hemolytic anemia  Paternal grandmother- Diabetes    Social History:  The patient reports that she has never smoked. She has never used smokeless tobacco. She reports current alcohol use. She reports that she does not use drugs.   PCP: Aide Mccord  Marital Status:     Physical Exam:   /79 (BP Location: Left arm, Patient Position: Sitting, Cuff Size: Adult  "Regular)   Pulse 71   Ht 1.575 m (5' 2\")   Wt 80.9 kg (178 lb 6.4 oz)   SpO2 99%   BMI 32.63 kg/m     Wt Readings from Last 4 Encounters:   04/07/22 80.9 kg (178 lb 6.4 oz)   10/07/21 79.6 kg (175 lb 8 oz)   11/16/19 79.8 kg (176 lb)   04/03/19 80.1 kg (176 lb 9.4 oz)     Constitutional: Well-developed, appearing stated age; cooperative.  Eyes: Normal EOM, PERRLA, vision, conjunctiva, sclera.  ENT: Normal external ears, nose, hearing, lips, teeth, gums, throat  MS: NO visible swelling, hands and fingers. Mild tenderness at 2nd to 3rd DIPs in R hand without gross synovial thickening.  Skin: No nail pitting, alopecia, rash, nodules or lesions.  Psych: Normal judgement, orientation, memory, affect.     Laboratory:   RHEUM RESULTS Latest Ref Rng & Units 8/28/2015 12/2/2015 7/13/2016   ALBUMIN 3.5 - 5.0 g/dL - - 4.3   ALT 0 - 50 U/L 58(H) 52(H) 29   AST 0 - 45 U/L 26 28 18   CREATININE 0.52 - 1.04 mg/dL 0.65 - 0.65   CRP 0.0 - 8.0 mg/L - 7.1 -   GFR ESTIMATE, IF BLACK >60 mL/min/1.7m2 >90  African American GFR Calc   - >90   GFR Calc     GFR ESTIMATE >60 mL/min/1.7m2 >90  Non African American GFR Calc   - >90  Non  GFR Calc     HEMATOCRIT 35.0 - 47.0 % 40.2 39.7 40.4   HEMOGLOBIN 11.7 - 15.7 g/dL 13.5 13.3 13.6   WBC 4.0 - 11.0 10e9/L 5.3 8.2 5.8   RBC 3.8 - 5.2 10e12/L 4.50 4.22 4.34   RDW 10.0 - 15.0 % 13.1 13.7 12.0   MCHC 31.5 - 36.5 g/dL 33.6 33.5 33.7   MCV 78 - 100 fl 89 94 93   PLATELET COUNT 150 - 450 10e9/L 254 283 211   QUANTIFERON TB GOLD neg Text - - -     QuantiFERON TB Gold   Date Value Ref Range Status   05/23/2017 neg neg Text Final       "

## 2022-04-07 ENCOUNTER — OFFICE VISIT (OUTPATIENT)
Dept: RHEUMATOLOGY | Facility: CLINIC | Age: 57
End: 2022-04-07
Payer: COMMERCIAL

## 2022-04-07 VITALS
DIASTOLIC BLOOD PRESSURE: 79 MMHG | HEART RATE: 71 BPM | BODY MASS INDEX: 32.83 KG/M2 | WEIGHT: 178.4 LBS | OXYGEN SATURATION: 99 % | HEIGHT: 62 IN | SYSTOLIC BLOOD PRESSURE: 116 MMHG

## 2022-04-07 DIAGNOSIS — L40.50 PSORIATIC ARTHRITIS (H): ICD-10-CM

## 2022-04-07 PROCEDURE — 99214 OFFICE O/P EST MOD 30 MIN: CPT | Performed by: INTERNAL MEDICINE

## 2022-04-07 RX ORDER — SECUKINUMAB 150 MG/ML
150 INJECTION SUBCUTANEOUS
Qty: 6 ML | Refills: 5 | Status: SHIPPED | OUTPATIENT
Start: 2022-04-07 | End: 2022-10-06

## 2022-04-07 ASSESSMENT — PAIN SCALES - GENERAL: PAINLEVEL: NO PAIN (0)

## 2022-04-07 NOTE — PATIENT INSTRUCTIONS
Diagnosis:  1.  Psoriatic arthritis: Resolved flare of inflammatory joint pain in hand, ankle, knee. Plan continue Cosentyx until 12 months of minimal/no symptoms.  2. Exercise induced L midfoot pain: not due to inflammatory arthropathy. Agree with planned Podiatry referral.    Plan:  1.  Continue Cosentyx 150 mg every other week.  2. Shingrix vaccination is safe.

## 2022-10-05 NOTE — PROGRESS NOTES
[facility]  Rheumatology Clinic  Joe Rodriguez MD  10/06/2022     Name: Veronica Sen  MRN: [id number]  Age: 56 year old  : 1965  Referring provider: Aide Mccord    Assessment and Plan:    # Psoriatic arthritis:  Patient relates persistent, significant relief from former small joint predominant arthralgia. exam shows no gross thickening in the joints of the hands and fingers. There is no skin disease. Last blood work from 2022 showed normal CBC, CRP, electrolytes, and creatinine.    Psoriatic arthritis continues overall well-controlled on Cosentyx, started in mid-2018. I recommend continuing Cosentyx 150 mg at an injection frequency of every 2 weeks.  This frequency is selected because patient has several times experienced recurrence of small joint polyarthralgia and altered joint mobility when reducing injection frequency beyond 14 days. Plan continue Cosentyx at least until 12 months of minimal/no symptoms. If using NSAIDs regularly,  I recommend every 6-12 mo measurement of creatinine, LFTs, and CBC.    # OA, 1rst CMC: stable, did not discuss today. Plan continue use of splints; IA injection may be safely repeated q 3-4 mos.    #  Exercise induced L midfoot pain: Tendon tear is likely cause. Now with reduced symptoms using AFO.    Plan:  1.  Continue Cosentyx 150 mg every other week.  2. Shingrix vaccination is safe.  3. Bloodwork CBC, Cr in the next month.    Follow-up: Return in about 12 months    Joe Rodriguez M.D.  [facility]  Pager [phone]    HPI:  Veronica Sen has a history including psoriatic arthritis who presents for follow-up. I last saw the patient , at which time the patient reported sustained reduction of joint pain in hands and feet on Cosentyx therapy. We planned to continue cosentyx 150 mg every 2 weeks.    Interval history 2022    She is wearing an AFO for 40% tendon tear in the L foot. Injury sustained over a  "year ago. Orthopedics following, repeat MRI pending.  She has been able to walk long distances.   Other joints are \"not too bad\". She notes more pain when she gets a migraine and after using imitrex. Otherwise the dull aching pain in the hands is less than before cosentyx. Using motrin on high-exertion days.  There is locking of several digits intermittently.  L postauricular scalp skin itches and burns occasionally.   Early morning stiffness is mild > 15 minutes.  She ahd the bivalent vax booster; she had marked artharlgia for 24 hours.  She continues cosentyx 150  Twice monthly  She will have flu shot; planning on Shingrix vaccination.    Interval history April 7, 2022    She has been feeling well overall. She hasn't had any hand joint pain since her last visit in October. Reports pain in her left foot is limiting her activity. She denies pain in any other joints at this time. She hasn't required ibuprofen for pain control recently. She denies joint stiffness, back pain, fever or chills.    She is on Cosentyx 150 mg q 2 weeks, tolerating well without any side effects. She hasn't had any headaches since adjustment in Cosyntex dosage.     She had COVID with mild flu-like symptoms in 1-2022.     Review of Systems:   Pertinent items are noted in HPI or as below, remainder of complete ROS is negative.      No recent problems with hearing or vision. No swallowing problems.   No breathing difficulty, shortness of breath, coughing, or wheezing.  No chest pain or palpitations.  No heart burn, indigestion, abdominal pain, nausea, vomiting, diarrhea.  No urination problems, no bloody, cloudy urine, no dysuria.  No numbing, tingling, weakness.  No headaches or confusion.  No rashes. No easy bleeding or bruising.     Current Outpatient Medications   Medication Sig     acetaminophen (TYLENOL) 325 MG tablet      Cetirizine HCl (ZYRTEC ALLERGY PO) Take 10 mg by mouth as needed     ibuprofen (ADVIL/MOTRIN) 200 MG tablet Take by " "mouth as needed     OMEPRAZOLE PO Take 20 mg by mouth every morning     propranolol SR BEADS (INDREAL XL) 80 MG 24 hr capsule Take 80 mg by mouth     secukinumab (COSENTYX SENSOREADY PEN) 150 MG/ML Sensoready pen Inject 1 mL (150 mg) Subcutaneous every 14 days Hold for signs of infection, and then seek medical attention.     simvastatin (ZOCOR) 20 MG tablet TAKE 1 TABLET BY MOUTH EVERYDAY AT BEDTIME     SUMAtriptan Succinate (IMITREX PO) Take 50 mg by mouth as needed for migraine     VITAMIN D, CHOLECALCIFEROL, PO Take 15,000 Units by mouth three times a week      No current facility-administered medications for this visit.         Allergies:  Hydrocodone   Morphine     Past Medical History:  Psoriatic arthritis  Neck pain  Intestinal diverticular abscess   Colonic diverticular abscess   Diverticula of colon   Diverticulitis   Osteoarthritis of finger   Long term current use of non-steroidal anti-inflammatories (NSAID)   Chronic migraine without aura   Headache   Female pelvic pain     Past Surgical History:   section x3  Endometrial ablation 2007  Sinus surgery   Colectomy, partial 2015  Colostomy takedown 2015  Knee arthroscopy with meniscectomy, left 2017  Hysterectomy     Family History:    Brother- Psoriasis, hypertension  Father- Arthritis, heart disease, sleep apnea  Mother- Hypertension, psoriasis  Sister- Down's syndrome, heart disease  Maternal grandfather- heart disease  Maternal grandmother- Hemolytic anemia  Paternal grandmother- Diabetes    Social History:  The patient reports that she has never smoked. She has never used smokeless tobacco. She reports current alcohol use. She reports that she does not use drugs.   PCP: Aide Mccord  Marital Status:     Physical Exam:   /71 (BP Location: Left arm, Patient Position: Sitting, Cuff Size: Adult Regular)   Pulse 73   Ht 1.575 m (5' 2\")   Wt 83.1 kg (183 lb 1.6 oz)   SpO2 98%   BMI 33.49 kg/m     Wt Readings " "from Last 4 Encounters:   10/06/22 83.1 kg (183 lb 1.6 oz)   04/07/22 80.9 kg (178 lb 6.4 oz)   10/07/21 79.6 kg (175 lb 8 oz)   11/16/19 79.8 kg (176 lb)     Constitutional: Well-developed, appearing stated age; cooperative.  Eyes: Normal EOM, PERRLA, vision, conjunctiva, sclera.  ENT: Normal external ears, nose, hearing, lips, teeth, gums, throat  MS: NO visible swelling, hands and fingers. Resolved: Mild tenderness at 2nd to 3rd DIPs in R hand without gross synovial thickening. Bony enlargement/angulation at multiple DIP; \"swan-neck\" change at r 3 digit. L ankle in AFO  Skin: No nail pitting, alopecia, rash, nodules or lesions.  Psych: Normal judgement, orientation, memory, affect.     Laboratory:   RHEUM RESULTS Latest Ref Rng & Units 8/28/2015 12/2/2015 7/13/2016   ALBUMIN 3.5 - 5.0 g/dL - - 4.3   ALT 0 - 50 U/L 58(H) 52(H) 29   AST 0 - 45 U/L 26 28 18   CREATININE 0.52 - 1.04 mg/dL 0.65 - 0.65   CRP 0.0 - 8.0 mg/L - 7.1 -   GFR ESTIMATE, IF BLACK >60 mL/min/1.7m2 >90  African American GFR Calc   - >90   GFR Calc     GFR ESTIMATE >60 mL/min/1.7m2 >90  Non African American GFR Calc   - >90  Non  GFR Calc     HEMATOCRIT 35.0 - 47.0 % 40.2 39.7 40.4   HEMOGLOBIN 11.7 - 15.7 g/dL 13.5 13.3 13.6   WBC 4.0 - 11.0 10e9/L 5.3 8.2 5.8   RBC 3.8 - 5.2 10e12/L 4.50 4.22 4.34   RDW 10.0 - 15.0 % 13.1 13.7 12.0   MCHC 31.5 - 36.5 g/dL 33.6 33.5 33.7   MCV 78 - 100 fl 89 94 93   PLATELET COUNT 150 - 450 10e9/L 254 283 211   QUANTIFERON TB GOLD neg Text - - -     QuantiFERON TB Gold   Date Value Ref Range Status   05/23/2017 neg neg Text Final       "

## 2022-10-06 ENCOUNTER — OFFICE VISIT (OUTPATIENT)
Dept: RHEUMATOLOGY | Facility: CLINIC | Age: 57
End: 2022-10-06
Payer: COMMERCIAL

## 2022-10-06 VITALS
HEIGHT: 62 IN | WEIGHT: 183.1 LBS | DIASTOLIC BLOOD PRESSURE: 71 MMHG | SYSTOLIC BLOOD PRESSURE: 109 MMHG | HEART RATE: 73 BPM | BODY MASS INDEX: 33.69 KG/M2 | OXYGEN SATURATION: 98 %

## 2022-10-06 DIAGNOSIS — L40.50 PSORIATIC ARTHRITIS (H): Primary | ICD-10-CM

## 2022-10-06 PROCEDURE — 99213 OFFICE O/P EST LOW 20 MIN: CPT | Performed by: INTERNAL MEDICINE

## 2022-10-06 RX ORDER — SECUKINUMAB 150 MG/ML
150 INJECTION SUBCUTANEOUS
Qty: 6 ML | Refills: 4 | Status: SHIPPED | OUTPATIENT
Start: 2022-10-06 | End: 2023-10-05

## 2022-10-06 ASSESSMENT — PAIN SCALES - GENERAL: PAINLEVEL: NO PAIN (0)

## 2022-10-06 NOTE — NURSING NOTE
"Chief Complaint   Patient presents with     RECHECK     Psoriatic arthritis       Vitals:    10/06/22 1327   BP: 109/71   BP Location: Left arm   Patient Position: Sitting   Cuff Size: Adult Regular   Pulse: 73   SpO2: 98%   Weight: 83.1 kg (183 lb 1.6 oz)   Height: 1.575 m (5' 2\")       Body mass index is 33.49 kg/m .    Rubina Bowden, [facility]  " Sore left leg  AVSS  + DF/PF  Mild swelling  SCDs in place  Mobilize  WBAT LLE  DVT proph  D/c planning  F/u local ortho or [facility] (if stays in [place]) 7-10 days   CMP/CBC

## 2022-10-06 NOTE — PATIENT INSTRUCTIONS
Diagnosis:  1.  Psoriatic arthritis: Minimal inflammatory joint pain in hand, ankle, knee. No psoriasis. Plan continue Cosentyx twice monthly.  2. Exercise induced L midfoot pain: likely related to ruptured L foot tendon.    Plan:  1.  Continue Cosentyx 150 mg every other week.  2. Shingrix vaccination is safe.  3. Bloodwork CBC, Cr in the next month.

## 2023-07-04 NOTE — LETTER
Date:November 20, 2017      Patient was self referred, no letter generated. Do not send.        [facility] Health Information       Yes

## 2023-10-05 ENCOUNTER — OFFICE VISIT (OUTPATIENT)
Dept: RHEUMATOLOGY | Facility: CLINIC | Age: 58
End: 2023-10-05
Payer: COMMERCIAL

## 2023-10-05 VITALS
SYSTOLIC BLOOD PRESSURE: 122 MMHG | RESPIRATION RATE: 16 BRPM | HEIGHT: 62 IN | WEIGHT: 188.5 LBS | BODY MASS INDEX: 34.69 KG/M2 | HEART RATE: 74 BPM | DIASTOLIC BLOOD PRESSURE: 80 MMHG | OXYGEN SATURATION: 98 %

## 2023-10-05 DIAGNOSIS — M19.042 PRIMARY OSTEOARTHRITIS OF BOTH HANDS: ICD-10-CM

## 2023-10-05 DIAGNOSIS — L40.50 PSORIATIC ARTHRITIS (H): Primary | ICD-10-CM

## 2023-10-05 DIAGNOSIS — M19.041 PRIMARY OSTEOARTHRITIS OF BOTH HANDS: ICD-10-CM

## 2023-10-05 PROCEDURE — 99214 OFFICE O/P EST MOD 30 MIN: CPT | Performed by: INTERNAL MEDICINE

## 2023-10-05 RX ORDER — SECUKINUMAB 150 MG/ML
150 INJECTION SUBCUTANEOUS
Qty: 6 ML | Refills: 4 | Status: SHIPPED | OUTPATIENT
Start: 2023-10-05

## 2023-10-05 RX ORDER — PROPRANOLOL HCL 60 MG
CAPSULE, EXTENDED RELEASE 24HR ORAL
COMMUNITY
Start: 2023-07-25

## 2023-10-05 ASSESSMENT — PAIN SCALES - GENERAL: PAINLEVEL: MODERATE PAIN (4)

## 2023-10-05 NOTE — PROGRESS NOTES
[facility]  Rheumatology Clinic  Joe Rodriguez MD  10/05/2023     Name: Veronica Sen  MRN: [id number]  Age: 57 year old  : 1965  Referring provider: Aide Mccord    Assessment and Plan:    # Psoriatic arthritis:  Patient relates persistent, significant relief from former small joint predominant arthralgia.  Exam shows bony enlargement and angulation with modest tenderness at left second DIP, right third DIP, and several PIPs.  No clear-cut inflammatory changes.  Fist formation slightly impaired.  There is no skin disease.     Last blood work from  showed normal CBC, electrolytes, and creatinine.    Discussion: Increasing severity and frequency of hand and finger joint stiffness and pain despite use of Cosentyx.  Most features of current pain are consistent with osteoarthritis rather than psoriatic arthritis.  However, the dramatic response to prednisone certainly could be compatible with a component of inflammatory disease.  I recommend plain films of the hands to help differentiate bony changes due to OA versus psoriatic arthritis.  I recommend continuing Cosentyx 150 mg at an injection frequency of every 2 weeks.  This frequency is selected because patient has several times experienced recurrence of small joint polyarthralgia and altered joint mobility when reducing injection frequency beyond 14 days.  I also recommend referral to Occupational Therapy for help with nonpharmacologic management of daily and pain. If using NSAIDs regularly,  I recommend every 6-12 mo measurement of creatinine and CBC.    If x-rays suggest bony changes due to erosive disease rather than osteoarthritis, would recommend substitution of anti-IL 23 for secukinumab.    # OA, 1rst CMC: stable, did not discuss today. Plan continue use of splints; IA injection may be safely repeated q 3-4 mos.    #  Exercise induced L midfoot pain: Tendon tear is likely cause. Now with reduced symptoms using  "AFO.    Plan:  1.  Continue Cosentyx 150 mg every other week.  2. Bloodwork CBC, Cr within 1 month  3. OTx referral for hand pain management  4. Xray hands today  5. Prednisone 15 mg daily X 7 days, then 10 mg daily for 7 days, once every 3 months for joint pain.    Follow-up: Return in about 7 months    Joe Rodriguez M.D.  Staff Rheumatologist,  Health  Pager [phone]    HPI:  Veronica Sen has a history including psoriatic arthritis who presents for follow-up. I last saw the patient  at which time the patient reported sustained reduction of joint pain in hands and feet on Cosentyx therapy. We planned to continue cosentyx 150 mg every 2 weeks.    Interval history October 5, 2023    Still with marked multiple DIP, PIP stiffness/pain.  Pain is most prominent with repetitive use.  There is exquisite tenderness to inadvertent contact with several joints, prominently the left second DIP.  There is locking of the right third DIP.  Knees and wrists are also stiff, often after inactivity such as driving for long periods.  Morning stiffness is not consistent.  No skin symptoms.  One course of low-dose, 2 weeks prednisone in the past several months gave dramatic relief from hand and finger pain.  Symptoms recurred rapidly within 5 days of discontinuing prednisone.  She continues Cosentyx dosed once every other week.    Interval history October 5, 2022    She is wearing an AFO for 40% tendon tear in the L foot. Injury sustained over a year ago. Orthopedics following, repeat MRI pending.  She has been able to walk long distances.   Other joints are \"not too bad\". She notes more pain when she gets a migraine and after using imitrex. Otherwise the dull aching pain in the hands is less than before cosentyx. Using motrin on high-exertion days.  There is locking of several digits intermittently.  L postauricular scalp skin itches and burns occasionally.   Early morning stiffness is mild > 15 minutes.  She ahd the " bivalent vax booster; she had marked artharlgia for 24 hours.  She continues cosentyx 150  Twice monthly  She will have flu shot; planning on Shingrix vaccination.    Interval history April 7, 2022    She has been feeling well overall. She hasn't had any hand joint pain since her last visit in October. Reports pain in her left foot is limiting her activity. She denies pain in any other joints at this time. She hasn't required ibuprofen for pain control recently. She denies joint stiffness, back pain, fever or chills.    She is on Cosentyx 150 mg q 2 weeks, tolerating well without any side effects. She hasn't had any headaches since adjustment in Cosyntex dosage.     She had COVID with mild flu-like symptoms in 1-2022.     Review of Systems:   Pertinent items are noted in HPI or as below, remainder of complete ROS is negative.      No recent problems with hearing or vision. No swallowing problems.   No breathing difficulty, shortness of breath, coughing, or wheezing.  No chest pain or palpitations.  No heart burn, indigestion, abdominal pain, nausea, vomiting, diarrhea.  No urination problems, no bloody, cloudy urine, no dysuria.  No numbing, tingling, weakness.  No headaches or confusion.  No rashes. No easy bleeding or bruising.     Current Outpatient Medications   Medication Sig    Cetirizine HCl (ZYRTEC ALLERGY PO) Take 10 mg by mouth as needed    OMEPRAZOLE PO Take 20 mg by mouth every morning    propranolol ER (INDERAL LA) 60 MG 24 hr capsule TAKE 1 CAPSULE (60 MG) BY MOUTH IN THE MORNING    secukinumab (COSENTYX SENSOREADY PEN) 150 MG/ML Sensoready pen Inject 1 mL (150 mg) Subcutaneous every 14 days Hold for signs of infection, and then seek medical attention.    simvastatin (ZOCOR) 20 MG tablet TAKE 1 TABLET BY MOUTH EVERYDAY AT BEDTIME    SUMAtriptan Succinate (IMITREX PO) Take 50 mg by mouth as needed for migraine    VITAMIN D, CHOLECALCIFEROL, PO Take 15,000 Units by mouth three times a week      "acetaminophen (TYLENOL) 325 MG tablet     ibuprofen (ADVIL/MOTRIN) 200 MG tablet Take by mouth as needed    propranolol SR BEADS (INDREAL XL) 80 MG 24 hr capsule Take 80 mg by mouth     No current facility-administered medications for this visit.         Allergies:  Hydrocodone   Morphine     Past Medical History:  Psoriatic arthritis  Neck pain  Intestinal diverticular abscess   Colonic diverticular abscess   Diverticula of colon   Diverticulitis   Osteoarthritis of finger   Long term current use of non-steroidal anti-inflammatories (NSAID)   Chronic migraine without aura   Headache   Female pelvic pain     Past Surgical History:   section x3  Endometrial ablation 2007  Sinus surgery   Colectomy, partial 2015  Colostomy takedown 2015  Knee arthroscopy with meniscectomy, left 2017  Hysterectomy     Family History:    Brother- Psoriasis, hypertension  Father- Arthritis, heart disease, sleep apnea  Mother- Hypertension, psoriasis  Sister- Down's syndrome, heart disease  Maternal grandfather- heart disease  Maternal grandmother- Hemolytic anemia  Paternal grandmother- Diabetes    Social History:  The patient reports that she has never smoked. She has never used smokeless tobacco. She reports current alcohol use. She reports that she does not use drugs.   PCP: Aide Mccord  Marital Status:     Physical Exam:   /80 (BP Location: Left arm, Patient Position: Sitting, Cuff Size: Adult Regular)   Pulse 74   Resp 16   Ht 1.575 m (5' 2\")   Wt 85.5 kg (188 lb 8 oz)   SpO2 98%   BMI 34.48 kg/m     Wt Readings from Last 4 Encounters:   10/05/23 85.5 kg (188 lb 8 oz)   10/06/22 83.1 kg (183 lb 1.6 oz)   22 80.9 kg (178 lb 6.4 oz)   10/07/21 79.6 kg (175 lb 8 oz)     Constitutional: Well-developed, appearing stated age; cooperative.  Eyes: Normal EOM, PERRLA, vision, conjunctiva, sclera.  ENT: Normal external ears, nose, hearing, lips, teeth, gums, throat  MS: NO visible " "swelling, hands and fingers. Resolved: Mild tenderness at 2nd to 3rd DIPs in R hand without gross synovial thickening. Bony enlargement/angulation at multiple DIP; \"swan-neck\" change at r 3 digit.  Hyperflexion at left second DIP with tenderness; without synovial thickening.  Skin: No nail pitting, alopecia, rash, nodules or lesions.  Psych: Normal judgement, orientation, memory, affect.     Laboratory:       Latest Ref Rng & Units 11/17/2017     8:53 AM 6/27/2018     8:07 AM 10/31/2018    11:16 AM   RHEUM RESULTS   ALT 0 - 50 U/L 40  32  36    AST 0 - 45 U/L 33  22  20    Creatinine 0.52 - 1.04 mg/dL 0.65  0.66  0.69    CRP Inflammation 0.0 - 8.0 mg/L <2.9  <2.9     GFR Estimate If Black >60 mL/min/1.7m2 >90  >90  >90    GFR Estimate >60 mL/min/1.7m2 >90  >90  89    Hematocrit 35.0 - 47.0 % 42.2  43.2  41.5    Hemoglobin 11.7 - 15.7 g/dL 13.8  14.1  13.6    WBC 4.0 - 11.0 10e9/L 6.5  5.9  6.5    RBC Count 3.8 - 5.2 10e12/L 4.45  4.64  4.41    RDW 10.0 - 15.0 % 12.4  12.0  12.2    MCHC 31.5 - 36.5 g/dL 32.7  32.6  32.8    MCV 78 - 100 fl 95  93  94    Platelet Count 150 - 450 10e9/L 216  212  227      QuantiFERON TB Gold   Date Value Ref Range Status   05/23/2017 neg neg Text Final     "

## 2023-10-05 NOTE — PATIENT INSTRUCTIONS
Diagnosis:  1.  Psoriatic arthritis: Increased joint pain in hands. Plan xray to distinguish osteoarthritis from inflammatory arthritis damage.  Consider switch in immunomodulatory therapy.  Meanwhile continue Cosentyx twice monthly.      Plan:  1.  Continue Cosentyx 150 mg every other week.  2. Bloodwork CBC, Cr within 1 month  3. OTx referral for hand pain management  4. Xray hands today  5. Prednisone 15 mg daily X 7 days, then 10 mg daily for 7 days, once every 3 months for joint pain.

## 2023-10-05 NOTE — NURSING NOTE
"Chief Complaint   Patient presents with    RECHECK     Psoriatic arthritis       Vitals:    10/05/23 1256   BP: 122/80   BP Location: Left arm   Patient Position: Sitting   Cuff Size: Adult Regular   Pulse: 74   Resp: 16   SpO2: 98%   Weight: 85.5 kg (188 lb 8 oz)   Height: 1.575 m (5' 2\")       Body mass index is 34.48 kg/m .    Rubina Bowden, KIELF  "

## 2023-10-12 ENCOUNTER — TRANSFERRED RECORDS (OUTPATIENT)
Dept: RHEUMATOLOGY | Facility: CLINIC | Age: 58
End: 2023-10-12

## 2023-12-03 ENCOUNTER — MYC MEDICAL ADVICE (OUTPATIENT)
Dept: RHEUMATOLOGY | Facility: CLINIC | Age: 58
End: 2023-12-03
Payer: COMMERCIAL

## 2023-12-04 NOTE — TELEPHONE ENCOUNTER
So sorry about the broken bone. From a bone healing and infection standpoint, there is no contraindication to continuing the cosentyx as scheduled. This guidance should be considered secondary to any that you receive from Orthopedics.

## 2024-01-09 ENCOUNTER — TRANSFERRED RECORDS (OUTPATIENT)
Dept: HEALTH INFORMATION MANAGEMENT | Facility: CLINIC | Age: 59
End: 2024-01-09
Payer: COMMERCIAL

## 2024-02-06 ENCOUNTER — MYC MEDICAL ADVICE (OUTPATIENT)
Dept: RHEUMATOLOGY | Facility: CLINIC | Age: 59
End: 2024-02-06
Payer: COMMERCIAL

## 2024-02-07 NOTE — TELEPHONE ENCOUNTER
"Patient called as she has been without her cosentyx for about 2 months due to unforeseen circumstances.  She broke her arm in December and didn't know if she would need surgery so she held the medication, her family then came down with Covid, then she had the flu.  This all ended mid January.  She remembered that she hadn't taken it just recently.  Her joints \"feel fine\".  Occasional aches and pains, but hasn't even taken tylenol or advil for the pain.  She is wondering if she should just stop it as you had mentioned a \"drug holiday\".      Aretha Laughlin RN    "

## 2024-02-08 NOTE — TELEPHONE ENCOUNTER
Sorry to hear about the fracture and the COVID.  Nevertheless, glad to hear that joint symptoms have not flared despite the holiday from Cosentyx.  I recommend continuing to monitor off medication.  Cosentyx may induce long-lasting remission, and there is little downside to watching and waiting for now, as it could be restarted if need be.

## 2024-04-06 ENCOUNTER — HEALTH MAINTENANCE LETTER (OUTPATIENT)
Age: 59
End: 2024-04-06

## 2024-04-06 ENCOUNTER — TRANSFERRED RECORDS (OUTPATIENT)
Dept: HEALTH INFORMATION MANAGEMENT | Facility: CLINIC | Age: 59
End: 2024-04-06
Payer: COMMERCIAL

## 2024-04-25 NOTE — NURSING NOTE
"Chief Complaint   Patient presents with     RECHECK     Psoriatic arthritis        Vitals:    04/07/22 1325   BP: 116/79   BP Location: Left arm   Patient Position: Sitting   Cuff Size: Adult Regular   Pulse: 71   SpO2: 99%   Weight: 80.9 kg (178 lb 6.4 oz)   Height: 1.575 m (5' 2\")       Body mass index is 32.63 kg/m .    Rubina Bowden MA    " Patent

## 2024-06-03 ENCOUNTER — MYC MEDICAL ADVICE (OUTPATIENT)
Dept: RHEUMATOLOGY | Facility: CLINIC | Age: 59
End: 2024-06-03
Payer: COMMERCIAL

## 2024-06-03 NOTE — TELEPHONE ENCOUNTER
Called patient, left message with Rheumatology phone number to call back.     Dr. Rodriguez had a cancellation this morning.  [employer] message sent as well.    Jannet Badillo RN

## 2024-06-04 NOTE — TELEPHONE ENCOUNTER
"Called patient today for an opening at 12:00 pm    Patient states she is doing \"super well\" at this time and would prefer to be taken off the call list to be seen sooner.  She is currently scheduled on 11/5/24.    Jannet Badillo RN    "

## 2024-11-03 NOTE — PROGRESS NOTES
[facility]  Rheumatology Clinic  Joe Rodriguez MD  24    Name: Veronica Sen  MRN: [id number]  Age: 58 year old  : 1965  Referring provider: Aide Mccord    Assessment and Plan:    # Psoriatic arthritis:  Patient relates persistent, significant relief from former small joint predominant arthralgia.  Exam shows bony enlargement and angulation with modest tenderness at left second DIP, right third DIP, and several PIPs.  No clear-cut inflammatory changes.  Fist formation slightly impaired.  There is no skin disease.     Data:  Imaging: X-rays of the hands in 2023 showed osseous erosions involving the tip of the left ulnar styloid and the radial aspect of the left fifth proximal phalanx, potentially right fifth DIP, consistent with an underlying inflammatory arthropathy.  Also DJD at first carpometacarpal joints.    Discussion:   Patient relates sustained, markedly reduced intensity and frequency of hand and finger joint stiffness since last visit.  Substantial improvement has occurred despite discontinuation of Cosentyx in 2023.  Characteristics of residual hand pain are most consistent with osteoarthritis, rather than with spondyloarthropathy or other inflammatory disease.  Plain x-rays in 2023 did not show clear-cut signs of damaging inflammatory arthropathy.  Accordingly, I think it reasonable to continue symptomatic treatment for hand osteoarthritis, and to monitor for recurrence of inflammatory joint symptoms.     If inflammatory arthritis recurs, recommend restart IL-17 inhibition or alternatively anti-IL 23.    # OA, 1rst CMC: stable; plan continue use of splints; IA injection may be safely repeated q 3-4 mos.    #  Exercise induced L midfoot pain: Tendon tear is likely cause. Now with reduced symptoms using AFO.    We discussed:    Diagnosis:  1.  Psoriatic arthritis, in remission  2. Osteoarthritis, hands    Plan:  1.  For osteoarthritis of the hands, use  "\"symptomatic treatment\" including heat, splints, exercises, adapted utensils; Occupational Therapy recommended measures; acetaminophen up to 1000 mg 3 times daily.  2.  For residual pain not relieved with acetaminophen, ibuprofen 400 to 600 mg may be used occasionally.  3.  Remain off Cosentyx.  Report to rheumatology symptoms of swelling, warmth, redness, persistent morning stiffness greater than 1 hour, or new persistent scalp or skin rash    Follow-up: prn    I was present with the medical student who took the history and acted as a scribe. I have verified the history, reviewed imaging and laboratory data, and personally performed the physical exam. I formulated the assessment and plan.    Joe Rodriguez M.D.  Staff Rheumatologist,  Health  Pager [phone]    On the day of the encounter, a total of 31 minutes was spent in chart review, and in counseling and coordination of care, regarding the patient's complex medical problem of seronegative inflammatory arthritis, osteoarthritis of the hands      HPI:  Veronica Sen has a history including psoriatic arthritis who presents for follow-up. I last saw the patient  at which time patient reported progressive bilateral small joint predominant pain.  Osteoarthritis was thought to underpinning a number of the symptoms, but x-rays were ordered to assess possible bony or erosive changes.. We planned to continue cosentyx 150 mg every 2 weeks.    Interval history November 5, 2024    Started taking Cosentyx to alleviate symptoms of arthritis. However, contracted SARS-CoV-2 and went off of it as of October 2023. As time went by, realized her arthritis symptoms had gotten better. Currently, she has not taken Cosentyx in over a year. Despite this, the joint pain in her hands and feet has significantly improved.    Overall, reports stiffness in the morning that resolves with movement after 10-20 minutes. Still experiences pain in her hands and feet but reports as " "mild (2 on a scale on 1-10), whereas before it was constant and of grater degree (4 to 5). If she carries out streinous activity, the pain in her joints can worsen, and she has recurred to taking Tylenol - which helps alleviate her pain. She also takes tylenol before physical activity if she knows it might lead to straining of her joints.      Interval history October 5, 2023    Still with marked multiple DIP, PIP stiffness/pain.  Pain is most prominent with repetitive use.  There is exquisite tenderness to inadvertent contact with several joints, prominently the left second DIP.  There is locking of the right third DIP.  Knees and wrists are also stiff, often after inactivity such as driving for long periods.  Morning stiffness is not consistent.  No skin symptoms.  One course of low-dose, 2 weeks prednisone in the past several months gave dramatic relief from hand and finger pain.  Symptoms recurred rapidly within 5 days of discontinuing prednisone.  She continues Cosentyx dosed once every other week.    Interval history October 5, 2022    She is wearing an AFO for 40% tendon tear in the L foot. Injury sustained over a year ago. Orthopedics following, repeat MRI pending.  She has been able to walk long distances.   Other joints are \"not too bad\". She notes more pain when she gets a migraine and after using imitrex. Otherwise the dull aching pain in the hands is less than before cosentyx. Using motrin on high-exertion days.  There is locking of several digits intermittently.  L postauricular scalp skin itches and burns occasionally.   Early morning stiffness is mild > 15 minutes.  She ahd the bivalent vax booster; she had marked artharlgia for 24 hours.  She continues cosentyx 150  Twice monthly  She will have flu shot; planning on Shingrix vaccination.    Interval history April 7, 2022    She has been feeling well overall. She hasn't had any hand joint pain since her last visit in October. Reports pain in her left " foot is limiting her activity. She denies pain in any other joints at this time. She hasn't required ibuprofen for pain control recently. She denies joint stiffness, back pain, fever or chills.    She is on Cosentyx 150 mg q 2 weeks, tolerating well without any side effects. She hasn't had any headaches since adjustment in Cosyntex dosage.     She had COVID with mild flu-like symptoms in 1-2022.     Review of Systems:   Pertinent items are noted in HPI or as below, remainder of complete ROS is negative.      No recent problems with hearing or vision. No swallowing problems.   No breathing difficulty, shortness of breath, coughing, or wheezing.  No chest pain or palpitations.  No heart burn, indigestion, abdominal pain, nausea, vomiting, diarrhea.  No urination problems, no bloody, cloudy urine, no dysuria.  No numbing, tingling, weakness.  No headaches or confusion.  No rashes. No easy bleeding or bruising.     Current Outpatient Medications   Medication Sig Dispense Refill    acetaminophen (TYLENOL) 325 MG tablet       Cetirizine HCl (ZYRTEC ALLERGY PO) Take 10 mg by mouth as needed      ibuprofen (ADVIL/MOTRIN) 200 MG tablet Take by mouth as needed      OMEPRAZOLE PO Take 20 mg by mouth every morning      propranolol ER (INDERAL LA) 60 MG 24 hr capsule TAKE 1 CAPSULE (60 MG) BY MOUTH IN THE MORNING      SUMAtriptan Succinate (IMITREX PO) Take 50 mg by mouth as needed for migraine      VITAMIN D, CHOLECALCIFEROL, PO Take 15,000 Units by mouth three times a week       secukinumab (COSENTYX SENSOREADY PEN) 150 MG/ML Sensoready pen Inject 1 mL (150 mg) Subcutaneous every 14 days Hold for signs of infection, and then seek medical attention. 6 mL 4    simvastatin (ZOCOR) 20 MG tablet TAKE 1 TABLET BY MOUTH EVERYDAY AT BEDTIME       No current facility-administered medications for this visit.         Allergies:  Hydrocodone   Morphine     Past Medical History:  Psoriatic arthritis  Neck pain  Intestinal diverticular  "abscess   Colonic diverticular abscess   Diverticula of colon   Diverticulitis   Osteoarthritis of finger   Long term current use of non-steroidal anti-inflammatories (NSAID)   Chronic migraine without aura   Headache   Female pelvic pain     Past Surgical History:   section x3  Endometrial ablation 2007  Sinus surgery 1991  Colectomy, partial 2015  Colostomy takedown 2015  Knee arthroscopy with meniscectomy, left 2017  Hysterectomy     Family History:    Brother- Psoriasis, hypertension  Father- Arthritis, heart disease, sleep apnea  Mother- Hypertension, psoriasis  Sister- Down's syndrome, heart disease  Maternal grandfather- heart disease  Maternal grandmother- Hemolytic anemia  Paternal grandmother- Diabetes    Social History:  The patient reports that she has never smoked. She has never used smokeless tobacco. She reports current alcohol use. She reports that she does not use drugs.   PCP: Aide Mccord  Marital Status:     Physical Exam:   /74 (BP Location: Left arm, Patient Position: Sitting, Cuff Size: Adult Large)   Pulse 72   Wt 86.2 kg (190 lb)   SpO2 98%   BMI 34.75 kg/m     Wt Readings from Last 4 Encounters:   24 86.2 kg (190 lb)   10/05/23 85.5 kg (188 lb 8 oz)   10/06/22 83.1 kg (183 lb 1.6 oz)   22 80.9 kg (178 lb 6.4 oz)     Constitutional: Well-developed, appearing stated age; cooperative.  Eyes: Normal EOM, PERRLA, vision, conjunctiva, sclera.  ENT: Normal external ears, nose, hearing, lips, teeth, gums, throat  MS: NO visible swelling, hands and fingers. Resolved: Mild tenderness at 2nd to 3rd DIPs in R hand without gross synovial thickening. Bony enlargement/angulation at multiple DIP; \"swan-neck\" change at r 3 digit.  Hyperflexion at left second DIP with tenderness; without synovial thickening.  Skin: No nail pitting, alopecia, rash, nodules or lesions.  Psych: Normal judgement, orientation, memory, affect.     Laboratory:       Latest Ref " Rng & Units 11/17/2017     8:53 AM 6/27/2018     8:07 AM 10/31/2018    11:16 AM   RHEUM RESULTS   ALT 0 - 50 U/L 40  32  36    AST 0 - 45 U/L 33  22  20    Creatinine 0.52 - 1.04 mg/dL 0.65  0.66  0.69    CRP Inflammation 0.0 - 8.0 mg/L <2.9  <2.9     GFR Estimate If Black >60 mL/min/1.7m2 >90  >90  >90    GFR Estimate >60 mL/min/1.7m2 >90  >90  89    Hematocrit 35.0 - 47.0 % 42.2  43.2  41.5    Hemoglobin 11.7 - 15.7 g/dL 13.8  14.1  13.6    WBC 4.0 - 11.0 10e9/L 6.5  5.9  6.5    RBC Count 3.8 - 5.2 10e12/L 4.45  4.64  4.41    RDW 10.0 - 15.0 % 12.4  12.0  12.2    MCHC 31.5 - 36.5 g/dL 32.7  32.6  32.8    MCV 78 - 100 fl 95  93  94    Platelet Count 150 - 450 10e9/L 216  212  227      QuantiFERON TB Gold   Date Value Ref Range Status   05/23/2017 neg neg Text Final

## 2024-11-05 ENCOUNTER — OFFICE VISIT (OUTPATIENT)
Dept: RHEUMATOLOGY | Facility: CLINIC | Age: 59
End: 2024-11-05
Attending: INTERNAL MEDICINE
Payer: COMMERCIAL

## 2024-11-05 VITALS
OXYGEN SATURATION: 98 % | DIASTOLIC BLOOD PRESSURE: 74 MMHG | HEART RATE: 72 BPM | BODY MASS INDEX: 34.75 KG/M2 | WEIGHT: 190 LBS | SYSTOLIC BLOOD PRESSURE: 116 MMHG

## 2024-11-05 DIAGNOSIS — L40.50 PSORIATIC ARTHRITIS (H): Primary | ICD-10-CM

## 2024-11-05 PROCEDURE — 99214 OFFICE O/P EST MOD 30 MIN: CPT | Performed by: INTERNAL MEDICINE

## 2024-11-05 ASSESSMENT — PAIN SCALES - GENERAL: PAINLEVEL_OUTOF10: NO PAIN (0)

## 2024-11-09 NOTE — PATIENT INSTRUCTIONS
"Diagnosis:  1.  Psoriatic arthritis, in remission  2. Osteoarthritis, hands      Plan:  1.  For osteoarthritis of the hands, use \"symptomatic treatment\" including heat, splints, exercises, adapted utensils, acetaminophen up to 1000 mg 3 times daily.  2.  For residual pain not relieved with acetaminophen, ibuprofen 400 to 600 mg may be used occasionally.  3.  Remain off Cosentyx.  Report to rheumatology symptoms of swelling, warmth, redness, persistent morning stiffness greater than 1 hour, or new persistent scalp or skin rash  "

## 2025-04-13 ENCOUNTER — HEALTH MAINTENANCE LETTER (OUTPATIENT)
Age: 60
End: 2025-04-13

## 2025-07-22 NOTE — TELEPHONE ENCOUNTER
PRIOR AUTHORIZATION DENIED    Medication: COSENTYX-DENIED    Denial Date: 5/26/2017    Denial Rational: PT NEEDS TO TRY PREFERRED DRUGS ON [facility], [facility] OR [facility]    Appeal Information: Appeals can be done over the phone by calling [phone]. Please let us know if you would like us to appeal this denial. If appealing, please provide a letter of medical necessity.         Refill passed per [facility] protocol.